# Patient Record
Sex: MALE | Race: WHITE | NOT HISPANIC OR LATINO | Employment: OTHER | URBAN - METROPOLITAN AREA
[De-identification: names, ages, dates, MRNs, and addresses within clinical notes are randomized per-mention and may not be internally consistent; named-entity substitution may affect disease eponyms.]

---

## 2022-04-10 ENCOUNTER — HISTORICAL (OUTPATIENT)
Dept: ADMINISTRATIVE | Facility: HOSPITAL | Age: 67
End: 2022-04-10

## 2022-04-30 VITALS
HEIGHT: 71 IN | BODY MASS INDEX: 31.79 KG/M2 | SYSTOLIC BLOOD PRESSURE: 142 MMHG | WEIGHT: 227.06 LBS | DIASTOLIC BLOOD PRESSURE: 81 MMHG | OXYGEN SATURATION: 97 %

## 2023-04-11 DIAGNOSIS — C61 MALIGNANT NEOPLASM OF PROSTATE: Primary | ICD-10-CM

## 2023-04-18 ENCOUNTER — HOSPITAL ENCOUNTER (OUTPATIENT)
Dept: RADIOLOGY | Facility: HOSPITAL | Age: 68
Discharge: HOME | End: 2023-04-18
Attending: NURSE PRACTITIONER
Payer: MEDICARE

## 2023-04-18 DIAGNOSIS — C61 MALIGNANT NEOPLASM OF PROSTATE: ICD-10-CM

## 2023-04-18 PROCEDURE — 78815 PET IMAGE W/CT SKULL-THIGH: CPT | Mod: TC

## 2023-05-18 ENCOUNTER — TELEPHONE (OUTPATIENT)
Dept: PRIMARY CARE CLINIC | Facility: CLINIC | Age: 68
End: 2023-05-18
Payer: MEDICARE

## 2023-05-18 NOTE — TELEPHONE ENCOUNTER
Pre Visit Call    Pt has visit on ....05/22  Did pt confirm appointment? no  Did pt answer or left vm on mobile number? Left vm   Wellness labs done?

## 2023-05-21 PROBLEM — N40.3 URINARY OBSTRUCTION DUE TO NODULAR PROSTATE: Status: ACTIVE | Noted: 2023-01-27

## 2023-05-21 PROBLEM — N13.8 URINARY OBSTRUCTION DUE TO NODULAR PROSTATE: Status: ACTIVE | Noted: 2023-01-27

## 2023-05-21 PROBLEM — R73.09 BLOOD GLUCOSE ABNORMAL: Status: ACTIVE | Noted: 2023-05-21

## 2023-05-21 PROBLEM — I10 HYPERTENSION: Status: ACTIVE | Noted: 2023-05-21

## 2023-05-21 PROBLEM — I65.21 STENOSIS OF RIGHT CAROTID ARTERY: Status: ACTIVE | Noted: 2023-05-21

## 2023-05-21 PROBLEM — E66.9 OBESITY: Status: ACTIVE | Noted: 2023-05-21

## 2023-05-21 PROBLEM — N39.41 URGE INCONTINENCE OF URINE: Status: ACTIVE | Noted: 2023-01-27

## 2023-05-21 PROBLEM — R97.20 RAISED PROSTATE SPECIFIC ANTIGEN: Status: ACTIVE | Noted: 2023-01-27

## 2023-05-21 PROBLEM — R68.82 REDUCED LIBIDO: Status: ACTIVE | Noted: 2023-05-21

## 2023-05-21 PROBLEM — E22.2 SYNDROME OF INAPPROPRIATE VASOPRESSIN SECRETION: Status: ACTIVE | Noted: 2023-05-21

## 2023-05-21 PROBLEM — C61 MALIGNANT TUMOR OF PROSTATE: Status: ACTIVE | Noted: 2023-02-24

## 2023-05-21 PROBLEM — Z95.1 HISTORY OF CORONARY ARTERY BYPASS GRAFT: Status: ACTIVE | Noted: 2023-05-21

## 2023-05-21 PROBLEM — K21.9 GASTROESOPHAGEAL REFLUX DISEASE: Status: ACTIVE | Noted: 2023-05-21

## 2023-05-21 PROBLEM — R33.9 INCOMPLETE EMPTYING OF BLADDER: Status: ACTIVE | Noted: 2023-01-27

## 2023-05-21 PROBLEM — I25.10 ARTERIOSCLEROSIS OF CORONARY ARTERY: Status: ACTIVE | Noted: 2023-05-21

## 2023-05-21 PROBLEM — I44.30 ATRIOVENTRICULAR BLOCK: Status: ACTIVE | Noted: 2023-05-21

## 2023-05-21 PROBLEM — N18.9 CHRONIC RENAL FAILURE: Status: ACTIVE | Noted: 2023-05-21

## 2023-05-21 PROBLEM — E78.2 MIXED HYPERLIPIDEMIA: Status: ACTIVE | Noted: 2023-05-21

## 2023-05-22 ENCOUNTER — OFFICE VISIT (OUTPATIENT)
Dept: PRIMARY CARE CLINIC | Facility: CLINIC | Age: 68
End: 2023-05-22
Payer: MEDICARE

## 2023-05-22 VITALS
OXYGEN SATURATION: 98 % | RESPIRATION RATE: 18 BRPM | HEART RATE: 49 BPM | SYSTOLIC BLOOD PRESSURE: 128 MMHG | HEIGHT: 71 IN | DIASTOLIC BLOOD PRESSURE: 60 MMHG | WEIGHT: 189 LBS | BODY MASS INDEX: 26.46 KG/M2

## 2023-05-22 DIAGNOSIS — C61 MALIGNANT TUMOR OF PROSTATE: Chronic | ICD-10-CM

## 2023-05-22 DIAGNOSIS — R73.09 BLOOD GLUCOSE ABNORMAL: ICD-10-CM

## 2023-05-22 DIAGNOSIS — E22.2 SYNDROME OF INAPPROPRIATE VASOPRESSIN SECRETION: ICD-10-CM

## 2023-05-22 DIAGNOSIS — N13.8 URINARY OBSTRUCTION DUE TO NODULAR PROSTATE: Chronic | ICD-10-CM

## 2023-05-22 DIAGNOSIS — N40.3 URINARY OBSTRUCTION DUE TO NODULAR PROSTATE: Chronic | ICD-10-CM

## 2023-05-22 DIAGNOSIS — E78.5 DYSLIPIDEMIA: Chronic | ICD-10-CM

## 2023-05-22 DIAGNOSIS — Z76.89 ESTABLISHING CARE WITH NEW DOCTOR, ENCOUNTER FOR: Primary | ICD-10-CM

## 2023-05-22 DIAGNOSIS — Z95.1 HISTORY OF CORONARY ARTERY BYPASS GRAFT: Chronic | ICD-10-CM

## 2023-05-22 DIAGNOSIS — I10 PRIMARY HYPERTENSION: Chronic | ICD-10-CM

## 2023-05-22 DIAGNOSIS — N18.9 CHRONIC RENAL FAILURE, UNSPECIFIED CKD STAGE: ICD-10-CM

## 2023-05-22 DIAGNOSIS — F41.8 ANXIETY WITH DEPRESSION: Chronic | ICD-10-CM

## 2023-05-22 DIAGNOSIS — N39.41 URGE INCONTINENCE OF URINE: ICD-10-CM

## 2023-05-22 DIAGNOSIS — Z00.00 WELLNESS EXAMINATION: ICD-10-CM

## 2023-05-22 PROBLEM — R33.9 INCOMPLETE EMPTYING OF BLADDER: Status: RESOLVED | Noted: 2023-01-27 | Resolved: 2023-05-22

## 2023-05-22 PROBLEM — E66.9 OBESITY: Status: RESOLVED | Noted: 2023-05-21 | Resolved: 2023-05-22

## 2023-05-22 PROBLEM — E78.2 MIXED HYPERLIPIDEMIA: Chronic | Status: ACTIVE | Noted: 2023-05-21

## 2023-05-22 PROBLEM — I65.21 STENOSIS OF RIGHT CAROTID ARTERY: Chronic | Status: ACTIVE | Noted: 2023-05-21

## 2023-05-22 PROBLEM — K21.9 GASTROESOPHAGEAL REFLUX DISEASE: Status: RESOLVED | Noted: 2023-05-21 | Resolved: 2023-05-22

## 2023-05-22 PROCEDURE — 99204 OFFICE O/P NEW MOD 45 MIN: CPT | Mod: ,,, | Performed by: GENERAL PRACTICE

## 2023-05-22 PROCEDURE — 99204 PR OFFICE/OUTPT VISIT, NEW, LEVL IV, 45-59 MIN: ICD-10-PCS | Mod: ,,, | Performed by: GENERAL PRACTICE

## 2023-05-22 RX ORDER — FUROSEMIDE 20 MG/1
20 TABLET ORAL
COMMUNITY
Start: 2023-03-13 | End: 2023-08-28 | Stop reason: SDUPTHER

## 2023-05-22 RX ORDER — FLUOXETINE HYDROCHLORIDE 20 MG/1
20 CAPSULE ORAL DAILY
Qty: 90 CAPSULE | Refills: 3 | Status: SHIPPED | OUTPATIENT
Start: 2023-05-22 | End: 2023-08-28

## 2023-05-22 RX ORDER — TAMSULOSIN HYDROCHLORIDE 0.4 MG/1
1 CAPSULE ORAL
COMMUNITY
Start: 2023-03-13

## 2023-05-22 RX ORDER — CARVEDILOL 25 MG/1
25 TABLET ORAL 2 TIMES DAILY
COMMUNITY
Start: 2023-03-24

## 2023-05-22 RX ORDER — CLOPIDOGREL BISULFATE 75 MG/1
75 TABLET ORAL
COMMUNITY
Start: 2023-04-27

## 2023-05-22 RX ORDER — ESCITALOPRAM OXALATE 20 MG/1
20 TABLET ORAL
COMMUNITY
Start: 2023-04-27 | End: 2023-05-22

## 2023-05-22 RX ORDER — AMLODIPINE AND BENAZEPRIL HYDROCHLORIDE 10; 40 MG/1; MG/1
1 CAPSULE ORAL
COMMUNITY
Start: 2023-03-13 | End: 2023-08-28 | Stop reason: SDUPTHER

## 2023-05-22 RX ORDER — FINASTERIDE 5 MG/1
5 TABLET, FILM COATED ORAL
COMMUNITY
Start: 2023-04-27

## 2023-05-22 RX ORDER — ATORVASTATIN CALCIUM 40 MG/1
40 TABLET, FILM COATED ORAL
COMMUNITY
Start: 2023-03-24

## 2023-05-22 RX ORDER — ASPIRIN 81 MG/1
TABLET ORAL
COMMUNITY

## 2023-05-22 NOTE — ASSESSMENT & PLAN NOTE
Continue cardiology follow-up, we will try to consolidate his blood work, lipid panel and CMP will be checked at his next wellness in six months.

## 2023-05-22 NOTE — LETTER
AUTHORIZATION FOR RELEASE OF   CONFIDENTIAL INFORMATION    Dear Dr Chakraborty,    We are seeing Curly Schwartz, date of birth 1955, in the clinic at [facility]. Jimbo Mora MD is the patient's PCP. Curly Schwartz has an outstanding lab/procedure at the time we reviewed his chart. In order to help keep his health information updated, he has authorized us to request the following medical record(s):        (  )  MAMMOGRAM                                      ( X )  COLONOSCOPY      (  )  PAP SMEAR                                          (  )  OUTSIDE LAB RESULTS     (  )  DEXA SCAN                                          (  )  EYE EXAM            (  )  FOOT EXAM                                          (  )  ENTIRE RECORD     (  )  OUTSIDE IMMUNIZATIONS                 (  )  _______________         Please fax records to [facility], Pernell J Simon, MD, [phone]     If you have any questions, please contact Priya Serrano LPN at [phone].           Patient Name: Curly Schwartz  : 1955  Patient Phone #: [phone]

## 2023-05-22 NOTE — PROGRESS NOTES
Subjective:       Patient ID: Curly Schwartz is a 67 y.o. male.    Chief Complaint: Establish Care    Patient presents to the clinic to establish primary care.  Past medical, family, and social history is reviewed, as well as all chronic conditions, and medications prescribed to treat those conditions.  He does have a history of coronary artery disease, four vessel bypass surgery in 2000, stable, does see a cardiologist, Dr. Lowery on a regular basis.  History of prostate cancer, diagnosed this past January, seeing Dr. Lockett, about to start treatment, TURP, and radiation with Dr. Stein.  History of anxiety with depression, previously on fluoxetine, discontinued by his previous PCP, always felt that the Prozac worked, was switched to Lexapro, not as effective, would like to switch back to Prozac if possible.  History of bladder obstruction, currently has an indwelling catheter, more than likely because of his prostate, diagnosed with chronic renal failure, probably secondary to this.  Have no previous lab results.    No complaints or problems today.      Review of Systems   Constitutional: Negative.  Negative for fatigue and fever.   HENT: Negative.  Negative for sore throat and trouble swallowing.    Eyes: Negative.  Negative for redness and visual disturbance.   Respiratory: Negative.  Negative for chest tightness and shortness of breath.    Cardiovascular: Negative.  Negative for chest pain.   Gastrointestinal: Negative.  Negative for abdominal pain, blood in stool and nausea.   Endocrine: Negative.  Negative for cold intolerance, heat intolerance and polyuria.   Genitourinary: Negative.    Musculoskeletal: Negative.  Negative for arthralgias, gait problem and joint swelling.   Integumentary:  Negative for rash. Negative.   Neurological: Negative.  Negative for dizziness, weakness and headaches.   Psychiatric/Behavioral: Negative.  Negative for agitation and dysphoric mood.        Objective:     Vitals:     "05/22/23 0956   BP: 128/60   Pulse: (!) 49   Resp: 18   SpO2: 98%   Weight: 85.7 kg (189 lb)   Height: 5' 11" (1.803 m)   Body mass index is 26.36 kg/m².     Physical Exam  Constitutional:       Appearance: Normal appearance.   HENT:      Head: Normocephalic.      Mouth/Throat:      Pharynx: Oropharynx is clear.   Eyes:      Conjunctiva/sclera: Conjunctivae normal.      Pupils: Pupils are equal, round, and reactive to light.   Cardiovascular:      Rate and Rhythm: Normal rate and regular rhythm.      Heart sounds: Normal heart sounds.   Pulmonary:      Effort: Pulmonary effort is normal.      Breath sounds: Normal breath sounds.   Abdominal:      General: Abdomen is flat.      Palpations: Abdomen is soft.   Musculoskeletal:         General: Normal range of motion.      Cervical back: Neck supple.   Skin:     General: Skin is warm and dry.   Neurological:      General: No focal deficit present.      Mental Status: He is oriented to person, place, and time.   Psychiatric:         Mood and Affect: Mood normal.         Behavior: Behavior normal.         Thought Content: Thought content normal.         Judgment: Judgment normal.         Assessment:     Problem List Items Addressed This Visit          Psychiatric    Anxiety with depression (Chronic)    Current Assessment & Plan     Discontinue Lexapro, start fluoxetine 20 mg daily, and recheck in six weeks.           Relevant Medications    FLUoxetine 20 MG capsule       Cardiac/Vascular    Primary hypertension (Chronic)    Relevant Orders    Hemoglobin A1C    TSH    Lipid Panel    Comprehensive Metabolic Panel    CBC Auto Differential    History of coronary artery bypass graft (Chronic)    Current Assessment & Plan     Continue cardiology follow-up, we will try to consolidate his blood work, lipid panel and CMP will be checked at his next wellness in six months.           Dyslipidemia (Chronic)    Current Assessment & Plan     Takes Lipitor 40 mg daily, lipid panel will " be checked in six months.              Renal/    Urinary obstruction due to nodular prostate (Chronic)    Current Assessment & Plan     Currently has an indwelling catheter, we will continue to see Dr. Lockett for this.           Urge incontinence of urine    Current Assessment & Plan     Unsure as to whether this is an active ongoing issue, but we will continue to monitor, more than likely this was because of his prostate issues.  He will continue to see Dr. Lockett.           Chronic renal failure    Current Assessment & Plan     Not sure if this is an active diagnosis, kidney functions will be checked at his next wellness in six months, more than likely this was secondary to his bladder obstruction.           Relevant Orders    Comprehensive Metabolic Panel       Oncology    Malignant tumor of prostate (Chronic)    Current Assessment & Plan     Diagnosed with prostate cancer this past January, will be beginning treatment soon with TURP and radiation.              Endocrine    Syndrome of inappropriate vasopressin secretion    Current Assessment & Plan     This does not seem to be an active ongoing diagnosis, we will resolve this as a chronic condition over time if it is not applicable to his current healthcare situation.           Blood glucose abnormal    Current Assessment & Plan     Not sure if this is a current condition, A1c and fasting glucose will be checked at his next wellness.           Relevant Orders    Hemoglobin A1C     Other Visit Diagnoses       Establishing care with new doctor, encounter for    -  Primary    Patient's medical care has been established in this clinic.  All issues addressed, all questions answered,  with appropriate scheduling of follow-up care.    Wellness examination        Relevant Orders    Hemoglobin A1C    TSH    Lipid Panel    Comprehensive Metabolic Panel    CBC Auto Differential    Hepatitis C Antibody                 Plan:             Follow up in about 6 months (around  11/30/2023) for Medicare Wellness.

## 2023-05-22 NOTE — ASSESSMENT & PLAN NOTE
Unsure as to whether this is an active ongoing issue, but we will continue to monitor, more than likely this was because of his prostate issues.  He will continue to see Dr. Lockett.

## 2023-05-22 NOTE — ASSESSMENT & PLAN NOTE
Not sure if this is a current condition, A1c and fasting glucose will be checked at his next wellness.

## 2023-05-22 NOTE — ASSESSMENT & PLAN NOTE
This does not seem to be an active ongoing diagnosis, we will resolve this as a chronic condition over time if it is not applicable to his current healthcare situation.

## 2023-05-22 NOTE — ASSESSMENT & PLAN NOTE
Not sure if this is an active diagnosis, kidney functions will be checked at his next wellness in six months, more than likely this was secondary to his bladder obstruction.

## 2023-05-22 NOTE — ASSESSMENT & PLAN NOTE
Diagnosed with prostate cancer this past January, will be beginning treatment soon with TURP and radiation.

## 2023-06-26 PROCEDURE — 87088 URINE BACTERIA CULTURE: CPT | Performed by: NURSE PRACTITIONER

## 2023-06-26 PROCEDURE — 87077 CULTURE AEROBIC IDENTIFY: CPT | Performed by: NURSE PRACTITIONER

## 2023-06-28 ENCOUNTER — LAB REQUISITION (OUTPATIENT)
Dept: LAB | Facility: HOSPITAL | Age: 68
End: 2023-06-28
Payer: MEDICARE

## 2023-06-28 DIAGNOSIS — N39.0 URINARY TRACT INFECTION, SITE NOT SPECIFIED: ICD-10-CM

## 2023-06-30 LAB — BACTERIA UR CULT: ABNORMAL

## 2023-08-27 NOTE — PROGRESS NOTES
"To Subjective:       Patient ID: Curly Schwartz is a 67 y.o. male.    Chief Complaint: No chief complaint on file.    Established patient, returns for recheck after starting fluoxetine 20 mg daily, and discontinuing Lexapro at his initial visit on 05/22/2023, history of anxiety with depression.  The fluoxetine seems to be working well, feels like he could be on a slightly higher dose.  I did review his chart once again, found that he did have some low potassium levels about six weeks ago, not on potassium supplementation.  Currently undergoing prostate cancer treatment, thinks they might be checking his electrolytes, not completely sure.      Review of Systems   Constitutional:  Negative for activity change, appetite change and fatigue.   Respiratory:  Negative for shortness of breath.    Cardiovascular:  Negative for chest pain.   Neurological:  Negative for memory loss.   Psychiatric/Behavioral:  Negative for agitation, confusion, decreased concentration, dysphoric mood and sleep disturbance.          Objective:     Vitals:    08/28/23 1431   BP: 132/68   Pulse: 72   Resp: 18   SpO2: 98%   Weight: 81.6 kg (180 lb)   Height: 5' 11" (1.803 m)   Body mass index is 25.1 kg/m².     Physical Exam  Constitutional:       Appearance: Normal appearance.   HENT:      Head: Normocephalic.   Eyes:      Pupils: Pupils are equal, round, and reactive to light.   Cardiovascular:      Rate and Rhythm: Normal rate and regular rhythm.   Pulmonary:      Effort: Pulmonary effort is normal.      Breath sounds: Normal breath sounds.   Musculoskeletal:      Left lower leg: Edema present.   Psychiatric:         Mood and Affect: Mood normal.         Behavior: Behavior normal.         Thought Content: Thought content normal.         Judgment: Judgment normal.           Assessment:     Problem List Items Addressed This Visit          Psychiatric    Anxiety with depression - Primary (Chronic)    Overview     Prescribed fluoxetine 20 mg " daily.         Current Assessment & Plan     Patient reports stability of moods, and effectiveness of medications, including no agitation, significant somnolence, or significant bouts of anxiety or depression.  Patient is not having any sleep disturbance.  Current treatment plan will continue, with plans to discuss any significant changes in patient's status if necessary if anything changes in the future.         Relevant Medications    FLUoxetine 40 MG capsule       Cardiac/Vascular    Primary hypertension (Chronic)    Overview     Prescribed amlodipine benazepril 10-40 mg daily, Coreg 25 mg b.i.d..         Current Assessment & Plan     Blood pressures appear to be adequately controlled with current treatment plan, including prescription blood pressure medication. Continue medical management and continue home blood pressure checks.  Blood pressure should be checked as discussed during medical visits, and average blood pressure should be no greater than 130/80.         Relevant Medications    amLODIPine-benazepriL (LOTREL) 10-40 mg per capsule    History of coronary artery bypass graft (Chronic)    Overview     Sees Dr. Lowery.  History of CABG several years ago.         Current Assessment & Plan     Medical condition is currently stable, continue current treatment plan.         Dyslipidemia (Chronic)    Overview     Prescribed Lipitor 40 mg daily.         Current Assessment & Plan     Lipid panel will be checked at next wellness exam            Renal/    Hypokalemia (Chronic)    Current Assessment & Plan     There have been several low potassium levels in the past six weeks, will recheck a BMP today, and consider potassium supplementation if his levels continue to be low.         Relevant Orders    Basic Metabolic Panel       Oncology    Malignant tumor of prostate (Chronic)    Current Assessment & Plan     Currently being treated for prostate cancer by his urologist, Dr. Lockett.            Other    Lower  extremity edema (Chronic)    Current Assessment & Plan     Chronic lower extremity edema, worse on the left side, the vein was removed for his CABG.  Takes furosemide 20 mg daily, continue current treatment plan.         Relevant Medications    furosemide (LASIX) 20 MG tablet          Current Outpatient Medications   Medication Instructions    abiraterone (ZYTIGA) 1,000 mg, Oral    amLODIPine-benazepriL (LOTREL) 10-40 mg per capsule 1 capsule, Oral, Daily    aspirin (ECOTRIN) 81 MG EC tablet Aspir-Low 81 mg tablet,delayed release   Take 1 tablet every day by oral route.    atorvastatin (LIPITOR) 40 mg, Oral    carvediloL (COREG) 25 mg, Oral, 2 times daily    clopidogreL (PLAVIX) 75 mg, Oral    finasteride (PROSCAR) 5 mg, Oral    FLUoxetine 40 mg, Oral, Daily    furosemide (LASIX) 20 mg, Oral, Daily    predniSONE (DELTASONE) 5 mg, Oral    tamsulosin (FLOMAX) 0.4 mg Cap 1 capsule, Oral     Plan:             No follow-ups on file.

## 2023-08-28 ENCOUNTER — OFFICE VISIT (OUTPATIENT)
Dept: PRIMARY CARE CLINIC | Facility: CLINIC | Age: 68
End: 2023-08-28
Payer: MEDICARE

## 2023-08-28 VITALS
SYSTOLIC BLOOD PRESSURE: 132 MMHG | BODY MASS INDEX: 25.2 KG/M2 | OXYGEN SATURATION: 98 % | RESPIRATION RATE: 18 BRPM | WEIGHT: 180 LBS | DIASTOLIC BLOOD PRESSURE: 68 MMHG | HEIGHT: 71 IN | HEART RATE: 72 BPM

## 2023-08-28 DIAGNOSIS — E87.6 HYPOKALEMIA: Chronic | ICD-10-CM

## 2023-08-28 DIAGNOSIS — Z95.1 HISTORY OF CORONARY ARTERY BYPASS GRAFT: Chronic | ICD-10-CM

## 2023-08-28 DIAGNOSIS — I10 PRIMARY HYPERTENSION: Chronic | ICD-10-CM

## 2023-08-28 DIAGNOSIS — E78.5 DYSLIPIDEMIA: Chronic | ICD-10-CM

## 2023-08-28 DIAGNOSIS — R60.0 LOWER EXTREMITY EDEMA: Chronic | ICD-10-CM

## 2023-08-28 DIAGNOSIS — F41.8 ANXIETY WITH DEPRESSION: Primary | Chronic | ICD-10-CM

## 2023-08-28 DIAGNOSIS — C61 MALIGNANT TUMOR OF PROSTATE: Chronic | ICD-10-CM

## 2023-08-28 PROBLEM — N40.3 URINARY OBSTRUCTION DUE TO NODULAR PROSTATE: Chronic | Status: RESOLVED | Noted: 2023-01-27 | Resolved: 2023-08-28

## 2023-08-28 PROBLEM — I25.10 ARTERIOSCLEROSIS OF CORONARY ARTERY: Status: RESOLVED | Noted: 2023-05-21 | Resolved: 2023-08-28

## 2023-08-28 PROBLEM — N39.41 URGE INCONTINENCE OF URINE: Chronic | Status: ACTIVE | Noted: 2023-01-27

## 2023-08-28 PROBLEM — N13.8 URINARY OBSTRUCTION DUE TO NODULAR PROSTATE: Chronic | Status: RESOLVED | Noted: 2023-01-27 | Resolved: 2023-08-28

## 2023-08-28 LAB
ANION GAP SERPL CALC-SCNC: 10 MEQ/L
BUN SERPL-MCNC: 12.6 MG/DL (ref 8.4–25.7)
CALCIUM SERPL-MCNC: 8.9 MG/DL (ref 8.8–10)
CHLORIDE SERPL-SCNC: 97 MMOL/L (ref 98–107)
CO2 SERPL-SCNC: 31 MMOL/L (ref 23–31)
CREAT SERPL-MCNC: 0.96 MG/DL (ref 0.73–1.18)
CREAT/UREA NIT SERPL: 13
GFR SERPLBLD CREATININE-BSD FMLA CKD-EPI: >60 MLS/MIN/1.73/M2
GLUCOSE SERPL-MCNC: 130 MG/DL (ref 82–115)
POTASSIUM SERPL-SCNC: 3 MMOL/L (ref 3.5–5.1)
SODIUM SERPL-SCNC: 138 MMOL/L (ref 136–145)

## 2023-08-28 PROCEDURE — 36415 COLL VENOUS BLD VENIPUNCTURE: CPT | Mod: ,,, | Performed by: GENERAL PRACTICE

## 2023-08-28 PROCEDURE — 99214 PR OFFICE/OUTPT VISIT, EST, LEVL IV, 30-39 MIN: ICD-10-PCS | Mod: ,,, | Performed by: GENERAL PRACTICE

## 2023-08-28 PROCEDURE — 80048 BASIC METABOLIC PNL TOTAL CA: CPT | Performed by: GENERAL PRACTICE

## 2023-08-28 PROCEDURE — 99214 OFFICE O/P EST MOD 30 MIN: CPT | Mod: ,,, | Performed by: GENERAL PRACTICE

## 2023-08-28 PROCEDURE — 36415 COLL VENOUS BLD VENIPUNCTURE: CPT | Performed by: GENERAL PRACTICE

## 2023-08-28 PROCEDURE — 36415 PR COLLECTION VENOUS BLOOD,VENIPUNCTURE: ICD-10-PCS | Mod: ,,, | Performed by: GENERAL PRACTICE

## 2023-08-28 RX ORDER — ABIRATERONE ACETATE 250 MG/1
1000 TABLET ORAL
COMMUNITY
Start: 2023-08-13

## 2023-08-28 RX ORDER — AMLODIPINE AND BENAZEPRIL HYDROCHLORIDE 10; 40 MG/1; MG/1
1 CAPSULE ORAL DAILY
Qty: 90 CAPSULE | Refills: 3 | Status: SHIPPED | OUTPATIENT
Start: 2023-08-28

## 2023-08-28 RX ORDER — FUROSEMIDE 20 MG/1
20 TABLET ORAL DAILY
Qty: 90 TABLET | Refills: 3 | Status: SHIPPED | OUTPATIENT
Start: 2023-08-28

## 2023-08-28 RX ORDER — FLUOXETINE HYDROCHLORIDE 40 MG/1
40 CAPSULE ORAL DAILY
Qty: 90 CAPSULE | Refills: 3 | Status: SHIPPED | OUTPATIENT
Start: 2023-08-28 | End: 2024-08-27

## 2023-08-28 RX ORDER — PREDNISONE 5 MG/1
5 TABLET ORAL
COMMUNITY
Start: 2023-08-13

## 2023-08-28 NOTE — ASSESSMENT & PLAN NOTE
There have been several low potassium levels in the past six weeks, will recheck a BMP today, and consider potassium supplementation if his levels continue to be low.

## 2023-08-28 NOTE — ASSESSMENT & PLAN NOTE
Chronic lower extremity edema, worse on the left side, the vein was removed for his CABG.  Takes furosemide 20 mg daily, continue current treatment plan.

## 2023-09-06 ENCOUNTER — TELEPHONE (OUTPATIENT)
Dept: PRIMARY CARE CLINIC | Facility: CLINIC | Age: 68
End: 2023-09-06
Payer: MEDICARE

## 2023-09-06 DIAGNOSIS — E87.6 HYPOKALEMIA: Primary | Chronic | ICD-10-CM

## 2023-09-06 RX ORDER — POTASSIUM CHLORIDE 20 MEQ/1
20 TABLET, EXTENDED RELEASE ORAL DAILY
Qty: 90 TABLET | Refills: 3 | Status: SHIPPED | OUTPATIENT
Start: 2023-09-06 | End: 2024-09-05

## 2023-09-06 NOTE — TELEPHONE ENCOUNTER
Phone call, potassium level low at 3.0, takes furosemide daily for edema.  Prescribed potassium supplementation 20 mEq daily, recheck at his next wellness in November.

## 2023-11-20 ENCOUNTER — CLINICAL SUPPORT (OUTPATIENT)
Dept: PRIMARY CARE CLINIC | Facility: CLINIC | Age: 68
End: 2023-11-20
Payer: MEDICARE

## 2023-11-20 DIAGNOSIS — Z00.00 WELLNESS EXAMINATION: ICD-10-CM

## 2023-11-20 DIAGNOSIS — Z00.00 ENCOUNTER FOR WELLNESS EXAMINATION IN ADULT: Primary | ICD-10-CM

## 2023-11-20 DIAGNOSIS — I10 PRIMARY HYPERTENSION: ICD-10-CM

## 2023-11-20 DIAGNOSIS — N18.9 CHRONIC RENAL FAILURE, UNSPECIFIED CKD STAGE: ICD-10-CM

## 2023-11-20 DIAGNOSIS — R73.09 BLOOD GLUCOSE ABNORMAL: ICD-10-CM

## 2023-11-20 LAB
ALBUMIN SERPL-MCNC: 3.9 G/DL (ref 3.4–4.8)
ALBUMIN/GLOB SERPL: 1.3 RATIO (ref 1.1–2)
ALP SERPL-CCNC: 64 UNIT/L (ref 40–150)
ALT SERPL-CCNC: 12 UNIT/L (ref 0–55)
AST SERPL-CCNC: 17 UNIT/L (ref 5–34)
BASOPHILS # BLD AUTO: 0.06 X10(3)/MCL
BASOPHILS NFR BLD AUTO: 1 %
BILIRUB SERPL-MCNC: 1 MG/DL
BUN SERPL-MCNC: 14.8 MG/DL (ref 8.4–25.7)
CALCIUM SERPL-MCNC: 9.5 MG/DL (ref 8.8–10)
CHLORIDE SERPL-SCNC: 100 MMOL/L (ref 98–107)
CHOLEST SERPL-MCNC: 141 MG/DL
CHOLEST/HDLC SERPL: 2 {RATIO} (ref 0–5)
CO2 SERPL-SCNC: 33 MMOL/L (ref 23–31)
CREAT SERPL-MCNC: 0.92 MG/DL (ref 0.73–1.18)
EOSINOPHIL # BLD AUTO: 0.08 X10(3)/MCL (ref 0–0.9)
EOSINOPHIL NFR BLD AUTO: 1.3 %
ERYTHROCYTE [DISTWIDTH] IN BLOOD BY AUTOMATED COUNT: 13.8 % (ref 11.5–17)
EST. AVERAGE GLUCOSE BLD GHB EST-MCNC: 119.8 MG/DL
GFR SERPLBLD CREATININE-BSD FMLA CKD-EPI: >60 MLS/MIN/1.73/M2
GLOBULIN SER-MCNC: 3 GM/DL (ref 2.4–3.5)
GLUCOSE SERPL-MCNC: 115 MG/DL (ref 82–115)
HBA1C MFR BLD: 5.8 %
HCT VFR BLD AUTO: 39.4 % (ref 42–52)
HDLC SERPL-MCNC: 60 MG/DL (ref 35–60)
HGB BLD-MCNC: 13.7 G/DL (ref 14–18)
IMM GRANULOCYTES # BLD AUTO: 0.03 X10(3)/MCL (ref 0–0.04)
IMM GRANULOCYTES NFR BLD AUTO: 0.5 %
LDLC SERPL CALC-MCNC: 63 MG/DL (ref 50–140)
LYMPHOCYTES # BLD AUTO: 0.41 X10(3)/MCL (ref 0.6–4.6)
LYMPHOCYTES NFR BLD AUTO: 6.8 %
MCH RBC QN AUTO: 31.6 PG (ref 27–31)
MCHC RBC AUTO-ENTMCNC: 34.8 G/DL (ref 33–36)
MCV RBC AUTO: 91 FL (ref 80–94)
MONOCYTES # BLD AUTO: 0.96 X10(3)/MCL (ref 0.1–1.3)
MONOCYTES NFR BLD AUTO: 15.9 %
NEUTROPHILS # BLD AUTO: 4.49 X10(3)/MCL (ref 2.1–9.2)
NEUTROPHILS NFR BLD AUTO: 74.5 %
NRBC BLD AUTO-RTO: 0 %
PLATELET # BLD AUTO: 217 X10(3)/MCL (ref 130–400)
PMV BLD AUTO: 8.1 FL (ref 7.4–10.4)
POTASSIUM SERPL-SCNC: 4 MMOL/L (ref 3.5–5.1)
PROT SERPL-MCNC: 6.9 GM/DL (ref 5.8–7.6)
RBC # BLD AUTO: 4.33 X10(6)/MCL (ref 4.7–6.1)
SODIUM SERPL-SCNC: 140 MMOL/L (ref 136–145)
TRIGL SERPL-MCNC: 89 MG/DL (ref 34–140)
TSH SERPL-ACNC: 1.3 UIU/ML (ref 0.35–4.94)
VLDLC SERPL CALC-MCNC: 18 MG/DL
WBC # SPEC AUTO: 6.03 X10(3)/MCL (ref 4.5–11.5)

## 2023-11-20 PROCEDURE — 36415 COLL VENOUS BLD VENIPUNCTURE: CPT | Mod: ,,, | Performed by: GENERAL PRACTICE

## 2023-11-20 PROCEDURE — 36415 COLL VENOUS BLD VENIPUNCTURE: CPT

## 2023-11-20 PROCEDURE — 36415 PR COLLECTION VENOUS BLOOD,VENIPUNCTURE: ICD-10-PCS | Mod: ,,, | Performed by: GENERAL PRACTICE

## 2023-11-21 LAB — HCV AB SERPL QL IA: NONREACTIVE

## 2023-11-29 PROBLEM — R73.03 PREDIABETES: Chronic | Status: ACTIVE | Noted: 2023-11-29

## 2023-11-29 NOTE — ASSESSMENT & PLAN NOTE
Component Ref Range & Units 9 d ago   Cholesterol Total <=200 mg/dL 141   HDL Cholesterol 35 - 60 mg/dL 60   Triglyceride 34 - 140 mg/dL 89   Cholesterol/HDL Ratio 0 - 5 2   Very Low Density Lipoprotein  18   LDL Cholesterol 50.00 - 140.00 mg/dL 63.00        Most recent cholesterol/lipid blood testing shows adequate control, continue current treatment plan, including prescription medications if prescribed, and/or diet high in fiber, low in saturated fats as discussed during clinic visit.

## 2023-11-29 NOTE — PROGRESS NOTES
Patient ID: [id number]     Chief Complaint: Annual Exam      HPI:     Curly Schwartz is a 68 y.o. male here today for a Medicare Wellness. No other complaints today.       ----------------------------  Anxiety  Arteriosclerosis of coronary artery  Depression  Hyperlipidemia  Hypertension  Incomplete emptying of bladder  Urinary obstruction due to nodular prostate     Past Surgical History:   Procedure Laterality Date    CORONARY ARTERY BYPASS GRAFT  2000    CORONARY STENT PLACEMENT      TRANSURETHRAL RESECTION OF PROSTATE  07/06/2023    [facility]       Review of patient's allergies indicates:   Allergen Reactions    Sulfa (sulfonamide antibiotics) Rash       Outpatient Medications Marked as Taking for the 11/30/23 encounter (Office Visit) with Jimbo Mora MD   Medication Sig Dispense Refill    abiraterone (ZYTIGA) 250 mg Tab Take 1,000 mg by mouth.      amLODIPine-benazepriL (LOTREL) 10-40 mg per capsule Take 1 capsule by mouth once daily. 90 capsule 3    aspirin (ECOTRIN) 81 MG EC tablet Aspir-Low 81 mg tablet,delayed release   Take 1 tablet every day by oral route.      atorvastatin (LIPITOR) 40 MG tablet Take 40 mg by mouth.      calcium phosphate trib/vit D3 (CALCIUM PHOSPHATE-VITAMIN D3 ORAL) Take 1 capsule by mouth 2 (two) times a day.      carvediloL (COREG) 25 MG tablet Take 25 mg by mouth 2 (two) times daily.      clopidogreL (PLAVIX) 75 mg tablet Take 75 mg by mouth.      finasteride (PROSCAR) 5 mg tablet Take 5 mg by mouth.      FLUoxetine 40 MG capsule Take 1 capsule (40 mg total) by mouth once daily. 90 capsule 3    furosemide (LASIX) 20 MG tablet Take 1 tablet (20 mg total) by mouth once daily. 90 tablet 3    potassium chloride SA (K-DUR,KLOR-CON) 20 MEQ tablet Take 1 tablet (20 mEq total) by mouth once daily. 90 tablet 3    predniSONE (DELTASONE) 5 MG tablet Take 5 mg by mouth.      tamsulosin (FLOMAX) 0.4 mg Cap Take 1 capsule by mouth.         Social History     Socioeconomic History    Marital  status:    Tobacco Use    Smoking status: Never    Smokeless tobacco: Never   Substance and Sexual Activity    Alcohol use: Not Currently     Alcohol/week: 2.0 standard drinks of alcohol     Types: 2 Cans of beer per week    Drug use: Never    Sexual activity: Not Currently     Partners: Female     Birth control/protection: None        Family History   Problem Relation Age of Onset    Cancer Mother         Kidney    Depression Mother     Hyperlipidemia Mother     Heart disease Father     Hyperlipidemia Father         Patient Care Team:  Jimbo Mora MD as PCP - General (Family Medicine)  MATTHIAS Chakraborty MD (Gastroenterology)  Lawson Lowery II, MD as Consulting Physician (Cardiology)  Drew Lockett MD as Consulting Physician (Urology)  Lewis Parmar MD as Consulting Physician (Urology)     Opioid Screening: Patient medication list reviewed, patient is not taking prescription opioids. Patient is not using additional opioids than prescribed. Patient is at low risk of substance abuse based on this opioid use history.       Subjective:     Review of Systems   Constitutional: Negative.  Negative for malaise/fatigue and weight loss.   HENT: Negative.     Eyes: Negative.    Respiratory: Negative.  Negative for shortness of breath.    Cardiovascular: Negative.  Negative for chest pain.   Gastrointestinal: Negative.    Genitourinary: Negative.    Musculoskeletal: Negative.    Skin: Negative.    Neurological: Negative.  Negative for focal weakness, weakness and headaches.   Endo/Heme/Allergies: Negative.    Psychiatric/Behavioral: Negative.  Negative for depression and memory loss. The patient is not nervous/anxious.          Patient Reported Health Risk Assessment  [age]  Are you male or female?: Male  During the past four weeks, how much have you been bothered by emotional problems such as feeling anxious, depressed, irritable, sad, or downhearted and blue?: Not at all  During the  past five weeks, has your physical and/or emotional health limited your social activities with family, friends, neighbors, or groups?: Not at all  During the past four weeks, how much bodily pain have you generally had?: No pain  During the past four weeks, was someone available to help if you needed and wanted help?: Yes, as much as I wanted  During the past four weeks, what was the hardest physical activity you could do for at least two minutes?: Light  Can you get to places out of walking distance without help?  (For example, can you travel alone on buses or taxis, or drive your own car?): Yes  Can you go shopping for groceries or clothes without someone's help?: Yes  Can you prepare your own meals?: Yes  Can you do your own housework without help?: Yes  Because of any health problems, do you need the help of another person with your personal care needs such as eating, bathing, dressing, or getting around the house?: No  Can you handle your own money without help?: Yes  During the past four weeks, how would you rate your health in general?: Good  How have things been going for you during the past four weeks?: Pretty well  Are you having difficulties driving your car?: No  Do you always fasten your seat belt when you are in a car?: Yes, usually  How often in the past four weeks have you been bothered by falling or dizzy when standing up?: Never  How often in the past four weeks have you been bothered by sexual problems?: Never  How often in the past four weeks have you been bothered by trouble eating well?: Never  How often in the past four weeks have you been bothered by teeth or denture problems?: Never  How often in the past four weeks have you been bothered with problems using the telephone?: Never  How often in the past four weeks have you been bothered by tiredness or fatigue?: Never  Have you fallen two or more times in the past year?: No  Are you afraid of falling?: No  Are you a smoker?: No  During the  "past four weeks, how many drinks of wine, beer, or other alcoholic beverages did you have?: One drink or less per week  Do you exercise for about 20 minutes three or more days a week?: No, I usually do not exercise this much  Have you been given any information to help you with hazards in your house that might hurt you?: No  Have you been given any information to help you with keeping track of your medications?: No  How often do you have trouble taking medicines the way you've been told to take them?: I always take them as prescribed  How confident are you that you can control and manage most of your health problems?: Very confident    Objective:     /60   Pulse 68   Resp 18   Ht 5' 11" (1.803 m)   Wt 82.6 kg (182 lb)   SpO2 99%   BMI 25.38 kg/m²     Physical Exam  Constitutional:       Appearance: Normal appearance.   HENT:      Head: Normocephalic.      Mouth/Throat:      Mouth: Mucous membranes are moist.      Pharynx: Oropharynx is clear.   Eyes:      Conjunctiva/sclera: Conjunctivae normal.      Pupils: Pupils are equal, round, and reactive to light.   Cardiovascular:      Rate and Rhythm: Normal rate and regular rhythm.      Heart sounds: Normal heart sounds.   Pulmonary:      Effort: Pulmonary effort is normal.      Breath sounds: Normal breath sounds.   Abdominal:      General: Abdomen is flat.      Palpations: Abdomen is soft.   Musculoskeletal:         General: Normal range of motion.      Cervical back: Neck supple.   Skin:     General: Skin is warm and dry.   Neurological:      General: No focal deficit present.      Mental Status: He is alert and oriented to person, place, and time.   Psychiatric:         Mood and Affect: Mood normal.         Behavior: Behavior normal.         Thought Content: Thought content normal.         Judgment: Judgment normal.         Lab Results   Component Value Date     11/20/2023    K 4.0 11/20/2023     07/07/2023    CO2 33 (H) 11/20/2023    BUN 14.8 " 11/20/2023    CREATININE 0.92 11/20/2023    CALCIUM 9.5 11/20/2023    ALBUMIN 3.9 11/20/2023    BILITOT 1.0 11/20/2023    ALKPHOS 64 11/20/2023    AST 17 11/20/2023    ALT 12 11/20/2023    ANIONGAP 8 07/07/2023    ESTGFRAFRICA 90 07/07/2023    EGFRNORACEVR >60 11/20/2023     Lab Results   Component Value Date    WBC 6.03 11/20/2023    RBC 4.33 (L) 11/20/2023    HGB 13.7 (L) 11/20/2023    HCT 39.4 (L) 11/20/2023    MCV 91.0 11/20/2023    RDW 13.8 11/20/2023     11/20/2023      Lab Results   Component Value Date    CHOL 141 11/20/2023    TRIG 89 11/20/2023    HDL 60 11/20/2023    LDL 63.00 11/20/2023    TOTALCHOLEST 2 11/20/2023     Lab Results   Component Value Date    TSH 1.303 11/20/2023     Lab Results   Component Value Date    HGBA1C 5.8 11/20/2023             No data to display                   No data to display                    Depression Screening  Over the past two weeks, has the patient felt down, depressed, or hopeless?: No  Over the past two weeks, has the patient felt little interest or pleasure in doing things?: No  Functional Ability/Safety Screening  Was the patient's timed Up & Go test unsteady or longer than 30 seconds?: No  Does the patient need help with phone, transportation, shopping, preparing meals, housework, laundry, meds, or managing money?: No  Does the patient's home have rugs in the hallway, lack grab bars in the bathroom, lack handrails on the stairs or have poor lighting?: No  Have you noticed any hearing difficulties?: No  Cognitive Function (Assessed through direct observation with due consideration of information obtained by way of patient reports and/or concerns raised by family, friends, caretakers, or others)    Does the patient repeat questions/statements in the same day?: No  Does the patient have trouble remembering the date, year, and time?: No  Does the patient have difficulty managing finances?: No  Does the patient have a decreased sense of direction?:  No  Assessment:       ICD-10-CM ICD-9-CM   1. Medicare annual wellness visit, subsequent  Z00.00 V70.0   2. Screening for prostate cancer  Z12.5 V76.44   3. Primary hypertension  I10 401.9   4. Dyslipidemia  E78.5 272.4   5. Stenosis of right carotid artery  I65.21 433.10   6. History of coronary artery bypass graft  Z95.1 V45.81   7. Anxiety with depression  F41.8 300.4   8. Hypokalemia  E87.6 276.8   9. Prediabetes  R73.03 790.29   10. Normocytic anemia  D64.9 285.9        Plan:     Medicare Annual Wellness and Personalized Prevention Plan:   Fall Risk + Home Safety + Hearing Impairment + Depression Screen + Cognitive Impairment Screen + Health Risk Assessment all reviewed.       Health Maintenance Topics with due status: Not Due       Topic Last Completion Date    TETANUS VACCINE 05/28/2014    Hemoglobin A1c (Prediabetes) 11/20/2023    Lipid Panel 11/20/2023      The patient's Health Maintenance was reviewed and the following appears to be due at this time:   Health Maintenance Due   Topic Date Due    RSV Vaccine (Age 60+ and Pregnant patients) (1 - 1-dose 60+ series) Never done    Influenza Vaccine (1) 09/01/2023     Health risk assessment:  After review of the patient's medical record as it relates to health risk assessment over the next 5-10 years per recommendations of the [place]PSTF, it was determined that all pertinent recommendations have been appropriately addressed. The patient does not desire or need any further preventative care measures or screening tests at this time.  We will continue to reassess at each annual visit.    1. Medicare annual wellness visit, subsequent  Comments:  Overall health status was reviewed.  Good health habits reinforced.  Annual wellness exams recommended.    2. Screening for prostate cancer  Comments:  Patient does see Urology.    3. Primary hypertension  Overview:  Prescribed amlodipine benazepril 10-40 mg daily, Coreg 25 mg b.i.d..    Assessment & Plan:  Blood pressures appear  to be adequately controlled with current treatment plan, including prescription blood pressure medication. Continue medical management and continue home blood pressure checks.  Blood pressure should be checked as discussed during medical visits, and average blood pressure should be no greater than 130/80.      4. Dyslipidemia  Overview:  Prescribed Lipitor 40 mg daily.    Assessment & Plan:       Component Ref Range & Units 9 d ago   Cholesterol Total <=200 mg/dL 141   HDL Cholesterol 35 - 60 mg/dL 60   Triglyceride 34 - 140 mg/dL 89   Cholesterol/HDL Ratio 0 - 5 2   Very Low Density Lipoprotein  18   LDL Cholesterol 50.00 - 140.00 mg/dL 63.00        Most recent cholesterol/lipid blood testing shows adequate control, continue current treatment plan, including prescription medications if prescribed, and/or diet high in fiber, low in saturated fats as discussed during clinic visit.      5. Stenosis of right carotid artery  Overview:  He does have a cardiologist, Dr. Lowery, whom he sees regularly.    Assessment & Plan:  Medical condition is currently stable, continue current treatment plan.      6. History of coronary artery bypass graft  Overview:  Sees Dr. Lowery.  History of CABG several years ago.    Assessment & Plan:  Medical condition is currently stable, continue current treatment plan.      7. Anxiety with depression  Overview:  Prescribed fluoxetine 20 mg daily.    Assessment & Plan:  Patient reports stability of moods, and effectiveness of medications, including no agitation, significant somnolence, or significant bouts of anxiety or depression.  Patient is not having any sleep disturbance.  Current treatment plan will continue, with plans to discuss any significant changes in patient's status if necessary if anything changes in the future.      8. Hypokalemia  Overview:  Recurrent hypokalemia, on Lasix, prescribed potassium chloride 20 mEq daily.    Assessment & Plan:            Component Ref Range & Units  9 d ago  (11/20/23) 3 mo ago  (8/28/23) 4 mo ago  (7/7/23) 4 mo ago  (7/7/23) 4 mo ago  (7/6/23) 4 mo ago  (7/6/23)   Sodium Level 136 - 145 mmol/L 140 138 137 137 139 136   Potassium Level 3.5 - 5.1 mmol/L 4.0 3.0 Low        Chloride 98 - 107 mmol/L 100 97 Low  100 R 101 R 104 R 104 R   Carbon Dioxide 23 - 31 mmol/L 33 High  31 29 R 25 R 27 R 23 R   Glucose Level 82 - 115 mg/dL 115 130 High        Blood Urea Nitrogen 8.4 - 25.7 mg/dL 14.8 12.6 16 R 13 R 13 R 13 R   Creatinine 0.73 - 1.18 mg/dL 0.92 0.96 0.92 R 0.90 R 0.90 R 0.81 R        Most recent potassium level normal, continue level of potassium supplementation.      9. Prediabetes  Overview:  Not prescribed any medication for prediabetes.    Assessment & Plan:       Component Ref Range & Units 9 d ago   Hemoglobin A1c <=7.0 % 5.8   Estimated Average Glucose mg/dL 119.8        Current average blood sugar falls into a range that is determined to be prediabetes.  In other words, if your blood sugar goes any higher, you could become diabetic.  Therefore, it is essential that we start a treatment plan that decreases your average blood sugar.  Follow a healthy meal plan.  This includes eating lean proteins, complex carbohydrates in moderation (rice, bread, pasta, potatoes), fresh fruits and vegetables, low-fat dairy products and healthy fats such as avocado, olive, canola or vegetable oil.  Simple carbohydrates such as sweets, containing sugar should be avoided or consumed in controlled amounts.    Physical activity as recommended, 30 more minutes up to 5 days a week.  This could be something as simple as brisk walking or biking.    Weight loss is also beneficial and may reverse diabetes or prediabetes.      10. Normocytic anemia  Overview:  Probably anemia of chronic disease, appears mild, we will continue to monitor.    Assessment & Plan:  Component Ref Range & Units 10 d ago 4 mo ago   WBC 4.50 - 11.50 x10(3)/mcL 6.03    RBC 4.70 - 6.10 x10(6)/mcL 4.33 Low      Hgb 14.0 - 18.0 g/dL 13.7 Low  11.5 Low    Hct 42.0 - 52.0 % 39.4 Low     MCV 80.0 - 94.0 fL 91.0    MCH 27.0 - 31.0 pg 31.6 High     MCHC 33.0 - 36.0 g/dL 34.8    RDW 11.5 - 17.0 % 13.8    Platelet 130 - 400 x10(3)/mcL 217      Mild anemia, we will monitor annually.              Advance Care Planning     Date: 11/29/2023    Living Will  During this visit, I engaged the patient  in the voluntary advance care planning process.  The patient and I reviewed the role for advance directives and their purpose in directing future healthcare if the patient's unable to speak for him/herself.  At this point in time, the patient does have full decision-making capacity.  We discussed different extreme health states that he could experience, and reviewed what kind of medical care he would want in those situations.  The patient communicated that if he were comatose and had little chance of a meaningful recovery, he would not want machines/life-sustaining treatments used. In addition to the above preference, other important end-of-life issues for the patient include no other issues.  Advanced care planning paperwork given to patient to bring home and discuss with the family.  Once signed, patient should bring form back for for the purposes of entering it into the medical record.  I spent a total of 5 minutes engaging the patient in this advance care planning discussion.              Current Outpatient Medications   Medication Instructions    abiraterone (ZYTIGA) 1,000 mg, Oral    amLODIPine-benazepriL (LOTREL) 10-40 mg per capsule 1 capsule, Oral, Daily    aspirin (ECOTRIN) 81 MG EC tablet Aspir-Low 81 mg tablet,delayed release   Take 1 tablet every day by oral route.    atorvastatin (LIPITOR) 40 mg, Oral    calcium phosphate trib/vit D3 (CALCIUM PHOSPHATE-VITAMIN D3 ORAL) 1 capsule, Oral, 2 times daily    carvediloL (COREG) 25 mg, Oral, 2 times daily    clopidogreL (PLAVIX) 75 mg, Oral    finasteride (PROSCAR) 5 mg, Oral     FLUoxetine 40 mg, Oral, Daily    furosemide (LASIX) 20 mg, Oral, Daily    potassium chloride SA (K-DUR,KLOR-CON) 20 MEQ tablet 20 mEq, Oral, Daily    predniSONE (DELTASONE) 5 mg, Oral    tamsulosin (FLOMAX) 0.4 mg Cap 1 capsule, Oral        Follow up in about 6 months (around 5/30/2024) for Chronic condition F/up. In addition to their scheduled follow up, the patient has also been instructed to follow up on as needed basis.

## 2023-11-29 NOTE — ASSESSMENT & PLAN NOTE
Component Ref Range & Units 9 d ago   Hemoglobin A1c <=7.0 % 5.8   Estimated Average Glucose mg/dL 119.8        Current average blood sugar falls into a range that is determined to be prediabetes.  In other words, if your blood sugar goes any higher, you could become diabetic.  Therefore, it is essential that we start a treatment plan that decreases your average blood sugar.  Follow a healthy meal plan.  This includes eating lean proteins, complex carbohydrates in moderation (rice, bread, pasta, potatoes), fresh fruits and vegetables, low-fat dairy products and healthy fats such as avocado, olive, canola or vegetable oil.  Simple carbohydrates such as sweets, containing sugar should be avoided or consumed in controlled amounts.    Physical activity as recommended, 30 more minutes up to 5 days a week.  This could be something as simple as brisk walking or biking.    Weight loss is also beneficial and may reverse diabetes or prediabetes.

## 2023-11-29 NOTE — ASSESSMENT & PLAN NOTE
Component Ref Range & Units 9 d ago  (11/20/23) 3 mo ago  (8/28/23) 4 mo ago  (7/7/23) 4 mo ago  (7/7/23) 4 mo ago  (7/6/23) 4 mo ago  (7/6/23)   Sodium Level 136 - 145 mmol/L 140 138 137 137 139 136   Potassium Level 3.5 - 5.1 mmol/L 4.0 3.0 Low        Chloride 98 - 107 mmol/L 100 97 Low  100 R 101 R 104 R 104 R   Carbon Dioxide 23 - 31 mmol/L 33 High  31 29 R 25 R 27 R 23 R   Glucose Level 82 - 115 mg/dL 115 130 High        Blood Urea Nitrogen 8.4 - 25.7 mg/dL 14.8 12.6 16 R 13 R 13 R 13 R   Creatinine 0.73 - 1.18 mg/dL 0.92 0.96 0.92 R 0.90 R 0.90 R 0.81 R        Most recent potassium level normal, continue level of potassium supplementation.

## 2023-11-30 ENCOUNTER — OFFICE VISIT (OUTPATIENT)
Dept: PRIMARY CARE CLINIC | Facility: CLINIC | Age: 68
End: 2023-11-30
Payer: MEDICARE

## 2023-11-30 VITALS
BODY MASS INDEX: 25.48 KG/M2 | OXYGEN SATURATION: 99 % | DIASTOLIC BLOOD PRESSURE: 60 MMHG | HEIGHT: 71 IN | SYSTOLIC BLOOD PRESSURE: 102 MMHG | HEART RATE: 68 BPM | RESPIRATION RATE: 18 BRPM | WEIGHT: 182 LBS

## 2023-11-30 DIAGNOSIS — E87.6 HYPOKALEMIA: Chronic | ICD-10-CM

## 2023-11-30 DIAGNOSIS — Z12.5 SCREENING FOR PROSTATE CANCER: ICD-10-CM

## 2023-11-30 DIAGNOSIS — D64.9 NORMOCYTIC ANEMIA: Chronic | ICD-10-CM

## 2023-11-30 DIAGNOSIS — R73.03 PREDIABETES: Chronic | ICD-10-CM

## 2023-11-30 DIAGNOSIS — I10 PRIMARY HYPERTENSION: Chronic | ICD-10-CM

## 2023-11-30 DIAGNOSIS — F41.8 ANXIETY WITH DEPRESSION: Chronic | ICD-10-CM

## 2023-11-30 DIAGNOSIS — E78.5 DYSLIPIDEMIA: Chronic | ICD-10-CM

## 2023-11-30 DIAGNOSIS — I65.21 STENOSIS OF RIGHT CAROTID ARTERY: Chronic | ICD-10-CM

## 2023-11-30 DIAGNOSIS — Z00.00 MEDICARE ANNUAL WELLNESS VISIT, SUBSEQUENT: Primary | ICD-10-CM

## 2023-11-30 DIAGNOSIS — Z95.1 HISTORY OF CORONARY ARTERY BYPASS GRAFT: Chronic | ICD-10-CM

## 2023-11-30 PROCEDURE — 99214 PR OFFICE/OUTPT VISIT, EST, LEVL IV, 30-39 MIN: ICD-10-PCS | Mod: ,,, | Performed by: GENERAL PRACTICE

## 2023-11-30 PROCEDURE — 99214 OFFICE O/P EST MOD 30 MIN: CPT | Mod: ,,, | Performed by: GENERAL PRACTICE

## 2023-11-30 NOTE — ASSESSMENT & PLAN NOTE
Component Ref Range & Units 10 d ago 4 mo ago   WBC 4.50 - 11.50 x10(3)/mcL 6.03    RBC 4.70 - 6.10 x10(6)/mcL 4.33 Low     Hgb 14.0 - 18.0 g/dL 13.7 Low  11.5 Low    Hct 42.0 - 52.0 % 39.4 Low     MCV 80.0 - 94.0 fL 91.0    MCH 27.0 - 31.0 pg 31.6 High     MCHC 33.0 - 36.0 g/dL 34.8    RDW 11.5 - 17.0 % 13.8    Platelet 130 - 400 x10(3)/mcL 217      Mild anemia, we will monitor annually.

## 2024-05-30 NOTE — PROGRESS NOTES
"Subjective:       Patient ID: Curly Schwartz is a 68 y.o. male.    Chief Complaint: Hypertension    Patient presents to the clinic today for chronic condition follow-up.  Doing well, no specific complaints, tolerating all medications, reporting no significant side effects or problems.    Last wellness exam was 11/30/2023.    Chronic conditions being treated include but are not limited to primary HTN, dyslipidemia, prediabetes, mood disorder, prostate cancer.    Does have a history of recurrent cerumen impactions, having some hearing loss.          Review of Systems   Constitutional: Negative.  Negative for fatigue and fever.   HENT:  Positive for hearing loss. Negative for sore throat and trouble swallowing.    Eyes: Negative.  Negative for redness and visual disturbance.   Respiratory: Negative.  Negative for chest tightness and shortness of breath.    Cardiovascular: Negative.  Negative for chest pain.   Gastrointestinal: Negative.  Negative for abdominal pain, blood in stool and nausea.   Endocrine: Negative.  Negative for cold intolerance, heat intolerance and polyuria.   Genitourinary: Negative.    Musculoskeletal: Negative.  Negative for arthralgias, gait problem and joint swelling.   Integumentary:  Negative for rash. Negative.   Neurological: Negative.  Negative for dizziness, weakness and headaches.   Psychiatric/Behavioral: Negative.  Negative for agitation and dysphoric mood.            Patient Care Team:  Jimbo Mora MD as PCP - General (Family Medicine)  MATTHIAS Chakraborty MD (Gastroenterology)  Lawson Lowery II, MD as Consulting Physician (Cardiology)  Drew Lockett MD as Consulting Physician (Urology)  Lewis Parmar MD as Consulting Physician (Urology)  Objective:   Visit Vitals  /68   Pulse 67   Resp 18   Ht 5' 11" (1.803 m)   Wt 85.3 kg (188 lb)   SpO2 99%   BMI 26.22 kg/m²        Physical Exam  Constitutional:       Appearance: Normal appearance.   HENT:      Head: " Normocephalic.      Right Ear: There is impacted cerumen.      Left Ear: There is impacted cerumen.      Mouth/Throat:      Mouth: Mucous membranes are moist.      Pharynx: Oropharynx is clear.   Eyes:      Conjunctiva/sclera: Conjunctivae normal.      Pupils: Pupils are equal, round, and reactive to light.   Cardiovascular:      Rate and Rhythm: Normal rate and regular rhythm.      Heart sounds: Normal heart sounds.   Pulmonary:      Effort: Pulmonary effort is normal.      Breath sounds: Normal breath sounds.   Abdominal:      General: Abdomen is flat.      Palpations: Abdomen is soft.   Musculoskeletal:         General: Normal range of motion.      Cervical back: Neck supple.   Skin:     General: Skin is warm and dry.   Neurological:      General: No focal deficit present.      Mental Status: He is alert and oriented to person, place, and time.   Psychiatric:         Mood and Affect: Mood normal.         Behavior: Behavior normal.         Thought Content: Thought content normal.         Judgment: Judgment normal.           Lab Results   Component Value Date     11/20/2023    K 4.0 11/20/2023     07/07/2023    CO2 33 (H) 11/20/2023    BUN 14.8 11/20/2023    CREATININE 0.92 11/20/2023    CALCIUM 9.5 11/20/2023    ALBUMIN 3.9 11/20/2023    BILITOT 1.0 11/20/2023    ALKPHOS 64 11/20/2023    AST 17 11/20/2023    ALT 12 11/20/2023    ANIONGAP 8 07/07/2023    ESTGFRAFRICA 90 07/07/2023    EGFRNORACEVR >60 11/20/2023     Lab Results   Component Value Date    WBC 6.03 11/20/2023    RBC 4.33 (L) 11/20/2023    HGB 13.7 (L) 11/20/2023    HCT 39.4 (L) 11/20/2023    MCV 91.0 11/20/2023    RDW 13.8 11/20/2023     11/20/2023      Lab Results   Component Value Date    CHOL 141 11/20/2023    TRIG 89 11/20/2023    HDL 60 11/20/2023    LDL 63.00 11/20/2023    TOTALCHOLEST 2 11/20/2023     Lab Results   Component Value Date    TSH 1.303 11/20/2023     Lab Results   Component Value Date    HGBA1C 5.8 11/20/2023          Assessment:       ICD-10-CM ICD-9-CM   1. Primary hypertension  I10 401.9   2. Dyslipidemia  E78.5 272.4   3. Prediabetes  R73.03 790.29   4. Malignant tumor of prostate  C61 185   5. History of coronary artery bypass graft  Z95.1 V45.81   6. Anxiety with depression  F41.8 300.4   7. Lower extremity edema  R60.0 782.3   8. Normocytic anemia  D64.9 285.9   9. Urge incontinence of urine  N39.41 788.31   10. Stenosis of right carotid artery  I65.21 433.10   11. Bilateral hearing loss due to cerumen impaction  H61.23 389.8     380.4       Current Outpatient Medications   Medication Instructions    abiraterone (ZYTIGA) 1,000 mg, Oral    amLODIPine-benazepriL (LOTREL) 10-40 mg per capsule 1 capsule, Oral, Daily    aspirin (ECOTRIN) 81 MG EC tablet Aspir-Low 81 mg tablet,delayed release   Take 1 tablet every day by oral route.    atorvastatin (LIPITOR) 40 mg, Oral    calcium phosphate trib/vit D3 (CALCIUM PHOSPHATE-VITAMIN D3 ORAL) 1 capsule, Oral, 2 times daily    carvediloL (COREG) 25 mg, Oral, 2 times daily    clopidogreL (PLAVIX) 75 mg, Oral    finasteride (PROSCAR) 5 mg, Oral    FLUoxetine 40 mg, Oral, Daily    furosemide (LASIX) 20 mg, Oral, Daily    potassium chloride SA (K-DUR,KLOR-CON) 20 MEQ tablet 20 mEq, Oral, Daily    predniSONE (DELTASONE) 5 mg, Oral    tamsulosin (FLOMAX) 0.4 mg Cap 1 capsule, Oral     Plan:     Problem List Items Addressed This Visit          Psychiatric    Anxiety with depression (Chronic)    Overview     Prescribed fluoxetine 20 mg daily.    Patient reports stability of moods, and effectiveness of medications, including no agitation, significant somnolence, or significant bouts of anxiety or depression.  Patient is not having any sleep disturbance.  Current treatment plan will continue, with plans to discuss any significant changes in patient's status if necessary if anything changes in the future.            ENT    Bilateral hearing loss due to cerumen impaction    Current  Assessment & Plan     Procedure note:  After informed consent was obtained, using a mixture of peroxide and water, the affected ear(s) was lavaged successfully by myself/staff, with removal of cerumen.  Post lavage examination revealed a clear external canal, and an un damaged intact tympanic membrane.         Relevant Orders    Ear Cerumen Removal (Completed)       Cardiac/Vascular    Stenosis of right carotid artery (Chronic)    Overview     He does have a cardiologist, Dr. Lowery, whom he sees regularly.         Primary hypertension - Primary (Chronic)    Overview     Prescribed amlodipine benazepril 10-40 mg daily, Coreg 25 mg b.i.d..    Blood pressures appear to be adequately controlled with current treatment plan, including prescription blood pressure medication.  Medication(s) appear to be effective at current dosages, and are not causing any side effects or problems.  Continue medical management and continue home blood pressure checks.  Average blood pressures at home should be no greater than 130/80.  Patient instructed to contact the clinic if blood pressures become elevated at any point prior to next clinic visit.         Relevant Orders    Comprehensive Metabolic Panel    CBC Auto Differential    TSH    History of coronary artery bypass graft (Chronic)    Overview     Sees Dr. Lowery.  History of CABG several years ago.    Patient sees a specialist for this condition.  This medical condition appears to be stable, patient will continue regular follow-up with the treating specialist.         Dyslipidemia (Chronic)    Overview     Prescribed Lipitor 40 mg daily.    Most recent cholesterol/lipid blood testing shows adequate control, continue current treatment plan, including prescription medications if prescribed, and/or diet high in fiber, low in saturated fats as discussed during clinic visit.         Relevant Orders    Lipid Panel       Renal/    Urge incontinence of urine (Chronic)    Overview     Sees  Urology, Dr. Lockett.    Patient sees a specialist for this condition.  This medical condition appears to be stable, patient will continue regular follow-up with the treating specialist.            Oncology    Malignant tumor of prostate (Chronic)    Overview     History of prostate cancer, sees his urologist, Dr. Lockett.         Normocytic anemia (Chronic)    Overview     Probably anemia of chronic disease, appears mild, we will continue to monitor.            Endocrine    Prediabetes (Chronic)    Overview     Patient has prediabetes and is not prescribed medication.  Patient's prediabetes is treated and controlled adequately using conservative means, specifically lifestyle modification, dietary changes, and/or increase in activity/exercise.         Relevant Orders    Hemoglobin A1C       Other    Lower extremity edema (Chronic)    Overview     Chronic lower extremity edema, worse on the left side, the vein was removed for his CABG. Takes furosemide 20 mg daily, continue current treatment plan.                     Follow up in about 6 months (around 12/5/2024) for Medicare Wellness.

## 2024-05-31 ENCOUNTER — OFFICE VISIT (OUTPATIENT)
Dept: PRIMARY CARE CLINIC | Facility: CLINIC | Age: 69
End: 2024-05-31
Payer: MEDICARE

## 2024-05-31 VITALS
OXYGEN SATURATION: 99 % | SYSTOLIC BLOOD PRESSURE: 122 MMHG | WEIGHT: 188 LBS | HEART RATE: 67 BPM | BODY MASS INDEX: 26.32 KG/M2 | HEIGHT: 71 IN | DIASTOLIC BLOOD PRESSURE: 68 MMHG | RESPIRATION RATE: 18 BRPM

## 2024-05-31 DIAGNOSIS — H61.23 BILATERAL HEARING LOSS DUE TO CERUMEN IMPACTION: ICD-10-CM

## 2024-05-31 DIAGNOSIS — I65.21 STENOSIS OF RIGHT CAROTID ARTERY: Chronic | ICD-10-CM

## 2024-05-31 DIAGNOSIS — Z95.1 HISTORY OF CORONARY ARTERY BYPASS GRAFT: Chronic | ICD-10-CM

## 2024-05-31 DIAGNOSIS — N39.41 URGE INCONTINENCE OF URINE: Chronic | ICD-10-CM

## 2024-05-31 DIAGNOSIS — R60.0 LOWER EXTREMITY EDEMA: Chronic | ICD-10-CM

## 2024-05-31 DIAGNOSIS — I10 PRIMARY HYPERTENSION: Primary | Chronic | ICD-10-CM

## 2024-05-31 DIAGNOSIS — C61 MALIGNANT TUMOR OF PROSTATE: Chronic | ICD-10-CM

## 2024-05-31 DIAGNOSIS — D64.9 NORMOCYTIC ANEMIA: Chronic | ICD-10-CM

## 2024-05-31 DIAGNOSIS — E78.5 DYSLIPIDEMIA: Chronic | ICD-10-CM

## 2024-05-31 DIAGNOSIS — R73.03 PREDIABETES: Chronic | ICD-10-CM

## 2024-05-31 DIAGNOSIS — F41.8 ANXIETY WITH DEPRESSION: Chronic | ICD-10-CM

## 2024-05-31 PROCEDURE — 69209 REMOVE IMPACTED EAR WAX UNI: CPT | Mod: 50,,, | Performed by: GENERAL PRACTICE

## 2024-05-31 PROCEDURE — 99214 OFFICE O/P EST MOD 30 MIN: CPT | Mod: 25,,, | Performed by: GENERAL PRACTICE

## 2024-05-31 NOTE — ASSESSMENT & PLAN NOTE
Procedure note:  After informed consent was obtained, using a mixture of peroxide and water, the affected ear(s) was lavaged successfully by myself/staff, with removal of cerumen.  Post lavage examination revealed a clear external canal, and an un damaged intact tympanic membrane.

## 2024-05-31 NOTE — PROCEDURES
"Ear Cerumen Removal    Date/Time: 5/31/2024 9:30 AM    Performed by: Jimbo Mora MD  Authorized by: Jimbo Mora MD    Time out: Immediately prior to procedure a "time out" was called to verify the correct patient, procedure, equipment, support staff and site/side marked as required.    Consent Done?:  Yes (Verbal)  Location details:  Both ears  Procedure type: irrigation    Cerumen  Removal Results:  Cerumen completely removed  Patient tolerance:  Patient tolerated the procedure well with no immediate complications    "

## 2024-08-05 ENCOUNTER — TELEPHONE (OUTPATIENT)
Dept: PRIMARY CARE CLINIC | Facility: CLINIC | Age: 69
End: 2024-08-05
Payer: MEDICARE

## 2024-08-05 DIAGNOSIS — F41.8 ANXIETY WITH DEPRESSION: Chronic | ICD-10-CM

## 2024-08-05 RX ORDER — FLUOXETINE HYDROCHLORIDE 40 MG/1
40 CAPSULE ORAL DAILY
Qty: 90 CAPSULE | Refills: 3 | Status: SHIPPED | OUTPATIENT
Start: 2024-08-05 | End: 2025-08-05

## 2024-08-26 DIAGNOSIS — E87.6 HYPOKALEMIA: Chronic | ICD-10-CM

## 2024-08-26 DIAGNOSIS — R60.0 LOWER EXTREMITY EDEMA: Chronic | ICD-10-CM

## 2024-08-26 DIAGNOSIS — I10 PRIMARY HYPERTENSION: Chronic | ICD-10-CM

## 2024-08-26 RX ORDER — FUROSEMIDE 20 MG/1
20 TABLET ORAL DAILY
Qty: 90 TABLET | Refills: 3 | Status: SHIPPED | OUTPATIENT
Start: 2024-08-26

## 2024-08-26 RX ORDER — AMLODIPINE AND BENAZEPRIL HYDROCHLORIDE 10; 40 MG/1; MG/1
1 CAPSULE ORAL DAILY
Qty: 90 CAPSULE | Refills: 3 | Status: SHIPPED | OUTPATIENT
Start: 2024-08-26

## 2024-08-26 RX ORDER — POTASSIUM CHLORIDE 20 MEQ/1
20 TABLET, EXTENDED RELEASE ORAL DAILY
Qty: 90 TABLET | Refills: 3 | Status: SHIPPED | OUTPATIENT
Start: 2024-08-26 | End: 2025-08-26

## 2024-09-22 ENCOUNTER — OFFICE VISIT (OUTPATIENT)
Dept: URGENT CARE | Facility: CLINIC | Age: 69
End: 2024-09-22
Payer: MEDICARE

## 2024-09-22 VITALS
DIASTOLIC BLOOD PRESSURE: 79 MMHG | TEMPERATURE: 99 F | RESPIRATION RATE: 16 BRPM | BODY MASS INDEX: 26.32 KG/M2 | OXYGEN SATURATION: 97 % | WEIGHT: 188 LBS | HEIGHT: 71 IN | SYSTOLIC BLOOD PRESSURE: 119 MMHG | HEART RATE: 69 BPM

## 2024-09-22 DIAGNOSIS — R33.9 URINARY RETENTION: Primary | ICD-10-CM

## 2024-09-22 PROCEDURE — 99213 OFFICE O/P EST LOW 20 MIN: CPT | Mod: ,,, | Performed by: FAMILY MEDICINE

## 2024-09-22 NOTE — PROGRESS NOTES
"Subjective:      Patient ID: Curly Schwartz is a 68 y.o. male.    Vitals:  height is 5' 11" (1.803 m) and weight is 85.3 kg (188 lb). His tympanic temperature is 99.2 °F (37.3 °C). His blood pressure is 119/79 and his pulse is 69. His respiration is 16 and oxygen saturation is 97%.     Chief Complaint: Hematuria     Patient is a 68 y.o. male who presents to urgent care with complaints of blood in urine and unable to fully empty bladder x this morning. Pt states he has a history of prostate cancer.  States he is unable to urinate at this point.  Starting to have bladder spasms and lower abdominal pain.  Denies any back pain.  Denies any fever.  States yesterday he had no issues voiding.    Constitution: Negative.   HENT: Negative.     Neck: neck negative.   Cardiovascular: Negative.    Eyes: Negative.    Respiratory: Negative.     Gastrointestinal: Negative.    Genitourinary:  Positive for urine decreased and hematuria.   Musculoskeletal: Negative.    Skin: Negative.    Allergic/Immunologic: Negative.    Neurological: Negative.    Hematologic/Lymphatic: Negative.       Objective:     Physical Exam   Constitutional: He is oriented to person, place, and time.  Non-toxic appearance. He does not appear ill. No distress.   HENT:   Head: Normocephalic and atraumatic.   Eyes: Conjunctivae are normal.   Pulmonary/Chest: Effort normal. No respiratory distress.   Abdominal: Normal appearance. There is abdominal tenderness (tenderness to palpation and firmness of the suprapubic area.). There is no left CVA tenderness and no right CVA tenderness.   Neurological: He is alert and oriented to person, place, and time.   Skin: Skin is not diaphoretic.   Psychiatric: His behavior is normal. Mood, judgment and thought content normal.   Vitals reviewed.         Previous History      Review of patient's allergies indicates:   Allergen Reactions    Sulfa (sulfonamide antibiotics) Rash       Past Medical History:   Diagnosis Date    " "Anxiety     Arteriosclerosis of coronary artery 5/21/2023    Depression     Hyperlipidemia     Hypertension     Incomplete emptying of bladder 01/27/2023    Urinary obstruction due to nodular prostate 1/27/2023     Current Outpatient Medications   Medication Instructions    abiraterone (ZYTIGA) 1,000 mg, Oral    amLODIPine-benazepriL (LOTREL) 10-40 mg per capsule 1 capsule, Oral, Daily    aspirin (ECOTRIN) 81 MG EC tablet Aspir-Low 81 mg tablet,delayed release   Take 1 tablet every day by oral route.    atorvastatin (LIPITOR) 40 mg, Oral    calcium phosphate trib/vit D3 (CALCIUM PHOSPHATE-VITAMIN D3 ORAL) 1 capsule, Oral, 2 times daily    carvediloL (COREG) 25 mg, Oral, 2 times daily    clopidogreL (PLAVIX) 75 mg, Oral    finasteride (PROSCAR) 5 mg, Oral    FLUoxetine 40 mg, Oral, Daily    furosemide (LASIX) 20 mg, Oral, Daily    potassium chloride SA (K-DUR,KLOR-CON) 20 MEQ tablet 20 mEq, Oral, Daily    predniSONE (DELTASONE) 5 mg, Oral    tamsulosin (FLOMAX) 0.4 mg Cap 1 capsule, Oral     Past Surgical History:   Procedure Laterality Date    CORONARY ARTERY BYPASS GRAFT  2000    CORONARY STENT PLACEMENT      TRANSURETHRAL RESECTION OF PROSTATE  07/06/2023    Speeg     Family History   Problem Relation Name Age of Onset    Cancer Mother Mechelle Schwartz         Kidney    Depression Mother Mechelle Schwartz     Hyperlipidemia Mother Mechelle Schwartz     Heart disease Father Sergio Schwartz     Hyperlipidemia Father Sergio Schwartz        Social History     Tobacco Use    Smoking status: Never    Smokeless tobacco: Never   Substance Use Topics    Alcohol use: Not Currently     Alcohol/week: 2.0 standard drinks of alcohol     Types: 2 Cans of beer per week    Drug use: Never        Physical Exam      Vital Signs Reviewed   /79   Pulse 69   Temp 99.2 °F (37.3 °C) (Tympanic)   Resp 16   Ht 5' 11" (1.803 m)   Wt 85.3 kg (188 lb)   SpO2 97%   BMI 26.22 kg/m²        Procedures    Procedures     " Labs     Results for orders placed or performed in visit on 11/20/23   Hepatitis C Antibody   Result Value Ref Range    Hep C Ab Interp Nonreactive Nonreactive   Comprehensive Metabolic Panel   Result Value Ref Range    Sodium 140 136 - 145 mmol/L    Potassium 4.0 3.5 - 5.1 mmol/L    Chloride 100 98 - 107 mmol/L    CO2 33 (H) 23 - 31 mmol/L    Glucose 115 82 - 115 mg/dL    Blood Urea Nitrogen 14.8 8.4 - 25.7 mg/dL    Creatinine 0.92 0.73 - 1.18 mg/dL    Calcium 9.5 8.8 - 10.0 mg/dL    Protein Total 6.9 5.8 - 7.6 gm/dL    Albumin 3.9 3.4 - 4.8 g/dL    Globulin 3.0 2.4 - 3.5 gm/dL    Albumin/Globulin Ratio 1.3 1.1 - 2.0 ratio    Bilirubin Total 1.0 <=1.5 mg/dL    ALP 64 40 - 150 unit/L    ALT 12 0 - 55 unit/L    AST 17 5 - 34 unit/L    eGFR >60 mls/min/1.73/m2   Lipid Panel   Result Value Ref Range    Cholesterol Total 141 <=200 mg/dL    HDL Cholesterol 60 35 - 60 mg/dL    Triglyceride 89 34 - 140 mg/dL    Cholesterol/HDL Ratio 2 0 - 5    Very Low Density Lipoprotein 18     LDL Cholesterol 63.00 50.00 - 140.00 mg/dL   TSH   Result Value Ref Range    TSH 1.303 0.350 - 4.940 uIU/mL   Hemoglobin A1C   Result Value Ref Range    Hemoglobin A1c 5.8 <=7.0 %    Estimated Average Glucose 119.8 mg/dL   CBC with Differential   Result Value Ref Range    WBC 6.03 4.50 - 11.50 x10(3)/mcL    RBC 4.33 (L) 4.70 - 6.10 x10(6)/mcL    Hgb 13.7 (L) 14.0 - 18.0 g/dL    Hct 39.4 (L) 42.0 - 52.0 %    MCV 91.0 80.0 - 94.0 fL    MCH 31.6 (H) 27.0 - 31.0 pg    MCHC 34.8 33.0 - 36.0 g/dL    RDW 13.8 11.5 - 17.0 %    Platelet 217 130 - 400 x10(3)/mcL    MPV 8.1 7.4 - 10.4 fL    Neut % 74.5 %    Lymph % 6.8 %    Mono % 15.9 %    Eos % 1.3 %    Basophil % 1.0 %    Lymph # 0.41 (L) 0.6 - 4.6 x10(3)/mcL    Neut # 4.49 2.1 - 9.2 x10(3)/mcL    Mono # 0.96 0.1 - 1.3 x10(3)/mcL    Eos # 0.08 0 - 0.9 x10(3)/mcL    Baso # 0.06 <=0.2 x10(3)/mcL    IG# 0.03 0 - 0.04 x10(3)/mcL    IG% 0.5 %    NRBC% 0.0 %       Assessment:     1. Urinary retention         Plan:   As we discussed, it is recommended that you present to the ER now for further evaluation to prevent a delay in care.         Urinary retention  -     Refer to Emergency Dept.

## 2024-11-14 ENCOUNTER — TELEPHONE (OUTPATIENT)
Dept: PRIMARY CARE CLINIC | Facility: CLINIC | Age: 69
End: 2024-11-14
Payer: MEDICARE

## 2024-11-14 NOTE — TELEPHONE ENCOUNTER
----- Message from [employer] sent at 11/11/2024 11:22 AM CST -----  .Who Called: Lauren Nurse with  [facility]     Caller is requesting assistance/information from provider's office.    Symptoms (please be specific): n/a   How long has patient had these symptoms:  n/a  List of preferred pharmacies on file (remove unneeded): [unfilled]  If different, enter pharmacy into here including location and phone number: n/a      Preferred Method of Contact: Phone Call  Patient's Preferred Phone Number on File:    Best Call Back Number, if different:-[phone]  Additional Information: Caller is advising that the pt fell while getting off of the toilet either  yesterday or day before yesterday and hit his face on the floor. The pt has a black eye on the right side and its bruised. The pt states he feels fie and didn't lose consciousness

## 2024-11-25 ENCOUNTER — TELEPHONE (OUTPATIENT)
Dept: PRIMARY CARE CLINIC | Facility: CLINIC | Age: 69
End: 2024-11-25
Payer: MEDICARE

## 2024-11-25 NOTE — TELEPHONE ENCOUNTER
----- Message from Crystal sent at 11/25/2024 12:24 PM CST -----  .Who Called: Gladys with [facility]    Caller is requesting assistance/information from provider's office.    Symptoms (please be specific): n/a   How long has patient had these symptoms:  n/a  List of preferred pharmacies on file (remove unneeded): [unfilled]  If different, enter pharmacy into here including location and phone number: n/a      Preferred Method of Contact: Phone Call    Patient's Preferred Phone Number on File: [phone]     Best Call Back Number, if different: [phone] (Gladys)    Additional Information: Called stating pt is being d/c today from home health. Pt is no longer home bound. Please advise, thank you.

## 2024-12-04 ENCOUNTER — CLINICAL SUPPORT (OUTPATIENT)
Dept: PRIMARY CARE CLINIC | Facility: CLINIC | Age: 69
End: 2024-12-04
Payer: MEDICARE

## 2024-12-04 DIAGNOSIS — R73.03 PREDIABETES: Chronic | ICD-10-CM

## 2024-12-04 DIAGNOSIS — E78.5 DYSLIPIDEMIA: Chronic | ICD-10-CM

## 2024-12-04 DIAGNOSIS — I10 PRIMARY HYPERTENSION: ICD-10-CM

## 2024-12-04 DIAGNOSIS — Z00.00 MEDICARE ANNUAL WELLNESS VISIT, INITIAL: Primary | ICD-10-CM

## 2024-12-04 LAB
ALBUMIN SERPL-MCNC: 3.2 G/DL (ref 3.4–4.8)
ALBUMIN/GLOB SERPL: 1.1 RATIO (ref 1.1–2)
ALP SERPL-CCNC: 78 UNIT/L (ref 40–150)
ALT SERPL-CCNC: 18 UNIT/L (ref 0–55)
ANION GAP SERPL CALC-SCNC: 7 MEQ/L
AST SERPL-CCNC: 21 UNIT/L (ref 5–34)
BASOPHILS # BLD AUTO: 0.04 X10(3)/MCL
BASOPHILS NFR BLD AUTO: 0.7 %
BILIRUB SERPL-MCNC: 0.4 MG/DL
BUN SERPL-MCNC: 15 MG/DL (ref 8.4–25.7)
CALCIUM SERPL-MCNC: 9 MG/DL (ref 8.8–10)
CHLORIDE SERPL-SCNC: 100 MMOL/L (ref 98–107)
CHOLEST SERPL-MCNC: 126 MG/DL
CHOLEST/HDLC SERPL: 2 {RATIO} (ref 0–5)
CO2 SERPL-SCNC: 27 MMOL/L (ref 23–31)
CREAT SERPL-MCNC: 0.99 MG/DL (ref 0.72–1.25)
CREAT/UREA NIT SERPL: 15
EOSINOPHIL # BLD AUTO: 0.14 X10(3)/MCL (ref 0–0.9)
EOSINOPHIL NFR BLD AUTO: 2.5 %
ERYTHROCYTE [DISTWIDTH] IN BLOOD BY AUTOMATED COUNT: 13.9 % (ref 11.5–17)
EST. AVERAGE GLUCOSE BLD GHB EST-MCNC: 99.7 MG/DL
GFR SERPLBLD CREATININE-BSD FMLA CKD-EPI: >60 ML/MIN/1.73/M2
GLOBULIN SER-MCNC: 2.9 GM/DL (ref 2.4–3.5)
GLUCOSE SERPL-MCNC: 121 MG/DL (ref 82–115)
HBA1C MFR BLD: 5.1 %
HCT VFR BLD AUTO: 30.2 % (ref 42–52)
HDLC SERPL-MCNC: 54 MG/DL (ref 35–60)
HGB BLD-MCNC: 10.2 G/DL (ref 14–18)
IMM GRANULOCYTES # BLD AUTO: 0.03 X10(3)/MCL (ref 0–0.04)
IMM GRANULOCYTES NFR BLD AUTO: 0.5 %
LDLC SERPL CALC-MCNC: 56 MG/DL (ref 50–140)
LYMPHOCYTES # BLD AUTO: 0.61 X10(3)/MCL (ref 0.6–4.6)
LYMPHOCYTES NFR BLD AUTO: 11 %
MCH RBC QN AUTO: 29.4 PG (ref 27–31)
MCHC RBC AUTO-ENTMCNC: 33.8 G/DL (ref 33–36)
MCV RBC AUTO: 87 FL (ref 80–94)
MONOCYTES # BLD AUTO: 0.71 X10(3)/MCL (ref 0.1–1.3)
MONOCYTES NFR BLD AUTO: 12.8 %
NEUTROPHILS # BLD AUTO: 4.01 X10(3)/MCL (ref 2.1–9.2)
NEUTROPHILS NFR BLD AUTO: 72.5 %
NRBC BLD AUTO-RTO: 0 %
PLATELET # BLD AUTO: 258 X10(3)/MCL (ref 130–400)
PMV BLD AUTO: 8 FL (ref 7.4–10.4)
POTASSIUM SERPL-SCNC: 3.8 MMOL/L (ref 3.5–5.1)
PROT SERPL-MCNC: 6.1 GM/DL (ref 5.8–7.6)
RBC # BLD AUTO: 3.47 X10(6)/MCL (ref 4.7–6.1)
SODIUM SERPL-SCNC: 134 MMOL/L (ref 136–145)
TRIGL SERPL-MCNC: 82 MG/DL (ref 34–140)
TSH SERPL-ACNC: 0.9 UIU/ML (ref 0.35–4.94)
VLDLC SERPL CALC-MCNC: 16 MG/DL
WBC # BLD AUTO: 5.54 X10(3)/MCL (ref 4.5–11.5)

## 2024-12-04 PROCEDURE — 80053 COMPREHEN METABOLIC PANEL: CPT | Performed by: GENERAL PRACTICE

## 2024-12-04 PROCEDURE — 83036 HEMOGLOBIN GLYCOSYLATED A1C: CPT | Performed by: GENERAL PRACTICE

## 2024-12-04 PROCEDURE — 80061 LIPID PANEL: CPT | Performed by: GENERAL PRACTICE

## 2024-12-04 PROCEDURE — 85025 COMPLETE CBC W/AUTO DIFF WBC: CPT | Performed by: GENERAL PRACTICE

## 2024-12-04 PROCEDURE — 36415 COLL VENOUS BLD VENIPUNCTURE: CPT

## 2024-12-04 PROCEDURE — 84443 ASSAY THYROID STIM HORMONE: CPT | Performed by: GENERAL PRACTICE

## 2024-12-04 NOTE — PROGRESS NOTES
Patient verified name and .Patient here for nurse visit to draw AW labs with butterfly needle. Patient was drawn in right antecubital .No complications or issues occurred. Patient tolerated venipuncture well. Patient expressed no questions or concerns.

## 2024-12-08 PROBLEM — H61.23 BILATERAL HEARING LOSS DUE TO CERUMEN IMPACTION: Status: RESOLVED | Noted: 2024-05-31 | Resolved: 2024-12-08

## 2024-12-08 NOTE — PROGRESS NOTES
Patient ID: [id number]     Chief Complaint: Annual Exam      HPI:     Curly Schwartz is a 69 y.o. male here today for a Medicare Wellness. No other complaints today.       -------------------------------------    Anxiety    Arteriosclerosis of coronary artery    Depression    Hyperlipidemia    Hypertension    Incomplete emptying of bladder    Urinary obstruction due to nodular prostate        Past Surgical History:   Procedure Laterality Date    CORONARY ARTERY BYPASS GRAFT  2000    CORONARY STENT PLACEMENT      TRANSURETHRAL RESECTION OF PROSTATE  07/06/2023    [facility]       Review of patient's allergies indicates:   Allergen Reactions    Sulfa (sulfonamide antibiotics) Rash and Other (See Comments)       Outpatient Medications Marked as Taking for the 12/9/24 encounter (Office Visit) with Jimbo Mora MD   Medication Sig Dispense Refill    abiraterone (ZYTIGA) 250 mg Tab Take 1,000 mg by mouth.      amLODIPine-benazepriL (LOTREL) 10-40 mg per capsule Take 1 capsule by mouth once daily. 90 capsule 3    atorvastatin (LIPITOR) 40 MG tablet Take 40 mg by mouth.      calcium phosphate trib/vit D3 (CALCIUM PHOSPHATE-VITAMIN D3 ORAL) Take 1 capsule by mouth 2 (two) times a day.      carvediloL (COREG) 25 MG tablet Take 25 mg by mouth 2 (two) times daily.      finasteride (PROSCAR) 5 mg tablet Take 5 mg by mouth.      FLUoxetine 40 MG capsule Take 1 capsule (40 mg total) by mouth once daily. 90 capsule 3    furosemide (LASIX) 20 MG tablet Take 1 tablet (20 mg total) by mouth once daily. 90 tablet 3    olmesartan-amLODIPin-hcthiazid 40-10-25 mg Tab Take 1 tablet by mouth once daily.      potassium chloride SA (K-DUR,KLOR-CON) 20 MEQ tablet Take 1 tablet (20 mEq total) by mouth once daily. 90 tablet 3    predniSONE (DELTASONE) 5 MG tablet Take 5 mg by mouth.      tamsulosin (FLOMAX) 0.4 mg Cap Take 1 capsule by mouth.         Social History     Socioeconomic History    Marital status:    Tobacco Use    Smoking  status: Never    Smokeless tobacco: Never   Substance and Sexual Activity    Alcohol use: Not Currently     Alcohol/week: 2.0 standard drinks of alcohol     Types: 2 Cans of beer per week    Drug use: Never    Sexual activity: Not Currently     Partners: Female     Birth control/protection: None        Family History   Problem Relation Name Age of Onset    Cancer Mother Mechelle Schwartz         Kidney    Depression Mother Mechelle Schwartz     Hyperlipidemia Mother Mechelle Schwartz     Heart disease Father Sergio Schwartz     Hyperlipidemia Father Sergio Schwartz         Patient Care Team:  Jimbo Mora MD as PCP - General (Family Medicine)  MATTHIAS Chakraborty MD (Gastroenterology)  Lawson Lowery II, MD as Consulting Physician (Cardiology)  Drew Lockett MD as Consulting Physician (Urology)  Lewis Parmar MD as Consulting Physician (Urology)     Opioid Screening: Patient medication list reviewed, patient is not taking prescription opioids. Patient is not using additional opioids than prescribed. Patient is at low risk of substance abuse based on this opioid use history.       Subjective:     Review of Systems   Constitutional: Negative.  Negative for malaise/fatigue and weight loss.   HENT: Negative.     Eyes: Negative.    Respiratory: Negative.  Negative for shortness of breath.    Cardiovascular: Negative.  Negative for chest pain.   Gastrointestinal: Negative.    Genitourinary: Negative.    Musculoskeletal: Negative.    Skin: Negative.    Neurological: Negative.  Negative for focal weakness, weakness and headaches.   Endo/Heme/Allergies: Negative.    Psychiatric/Behavioral: Negative.  Negative for depression and memory loss. The patient is not nervous/anxious.          Patient Reported Health Risk Assessment  What [age] 70-79  Are you male or female?: Male  During the past four weeks, how much have you been bothered by emotional problems such as feeling anxious, depressed, irritable, sad, or downhearted  and blue?: Not at all  During the past five weeks, has your physical and/or emotional health limited your social activities with family, friends, neighbors, or groups?: Not at all  During the past four weeks, how much bodily pain have you generally had?: Mild pain  During the past four weeks, was someone available to help if you needed and wanted help?: Yes, as much as I wanted  During the past four weeks, what was the hardest physical activity you could do for at least two minutes?: Moderate  Can you get to places out of walking distance without help?  (For example, can you travel alone on buses or taxis, or drive your own car?): Yes  Can you go shopping for groceries or clothes without someone's help?: Yes  Can you prepare your own meals?: Yes  Can you do your own housework without help?: Yes  Because of any health problems, do you need the help of another person with your personal care needs such as eating, bathing, dressing, or getting around the house?: No  Can you handle your own money without help?: No  During the past four weeks, how would you rate your health in general?: Good  How have things been going for you during the past four weeks?: Pretty well  Are you having difficulties driving your car?: No  Do you always fasten your seat belt when you are in a car?: Yes, usually  How often in the past four weeks have you been bothered by falling or dizzy when standing up?: Sometimes  How often in the past four weeks have you been bothered by sexual problems?: Sometimes  How often in the past four weeks have you been bothered by trouble eating well?: Seldom  How often in the past four weeks have you been bothered by teeth or denture problems?: Sometimes  How often in the past four weeks have you been bothered with problems using the telephone?: Sometimes  How often in the past four weeks have you been bothered by tiredness or fatigue?: Seldom  Have you fallen two or more times in the past year?: No  Are you  "afraid of falling?: No  Are you a smoker?: No  During the past four weeks, how many drinks of wine, beer, or other alcoholic beverages did you have?: One drink or less per week  Do you exercise for about 20 minutes three or more days a week?: No, I usually do not exercise this much  Have you been given any information to help you with hazards in your house that might hurt you?: Yes  Have you been given any information to help you with keeping track of your medications?: Yes  How often do you have trouble taking medicines the way you've been told to take them?: I always take them as prescribed  How confident are you that you can control and manage most of your health problems?: Very confident  What is your race? (Check all that apply.):     Objective:     /75 (Patient Position: Sitting)   Pulse 67   Ht 5' 11" (1.803 m)   Wt 85.5 kg (188 lb 6.4 oz)   BMI 26.28 kg/m²     Physical Exam  Constitutional:       Appearance: Normal appearance.   HENT:      Head: Normocephalic.      Mouth/Throat:      Mouth: Mucous membranes are moist.      Pharynx: Oropharynx is clear.   Eyes:      Conjunctiva/sclera: Conjunctivae normal.      Pupils: Pupils are equal, round, and reactive to light.   Cardiovascular:      Rate and Rhythm: Normal rate and regular rhythm.      Heart sounds: Normal heart sounds.   Pulmonary:      Effort: Pulmonary effort is normal.      Breath sounds: Normal breath sounds.   Abdominal:      General: Abdomen is flat.      Palpations: Abdomen is soft.   Musculoskeletal:         General: Normal range of motion.      Cervical back: Neck supple.   Skin:     General: Skin is warm and dry.   Neurological:      General: No focal deficit present.      Mental Status: He is alert and oriented to person, place, and time.   Psychiatric:         Mood and Affect: Mood normal.         Behavior: Behavior normal.         Thought Content: Thought content normal.         Judgment: Judgment normal.         Lab " Results   Component Value Date     (L) 12/04/2024    K 3.8 12/04/2024     12/04/2024    CO2 27 12/04/2024    BUN 15.0 12/04/2024    CREATININE 0.99 12/04/2024    CALCIUM 9.0 12/04/2024    ALBUMIN 3.2 (L) 12/04/2024    BILITOT 0.4 12/04/2024    ALKPHOS 78 12/04/2024    AST 21 12/04/2024    ALT 18 12/04/2024    ANIONGAP 10 11/01/2024    ESTGFRAFRICA 87 11/01/2024    EGFRNORACEVR >60 12/04/2024     Lab Results   Component Value Date    WBC 5.54 12/04/2024    RBC 3.47 (L) 12/04/2024    HGB 10.2 (L) 12/04/2024    HCT 30.2 (L) 12/04/2024    MCV 87.0 12/04/2024    RDW 13.9 12/04/2024     12/04/2024      Lab Results   Component Value Date    CHOL 126 12/04/2024    TRIG 82 12/04/2024    HDL 54 12/04/2024    LDL 56.00 12/04/2024    TOTALCHOLEST 2 12/04/2024     Lab Results   Component Value Date    TSH 0.899 12/04/2024     Lab Results   Component Value Date    HGBA1C 5.1 12/04/2024            No data to display                  12/9/2024     9:30 AM   Fall Risk Assessment - Outpatient   Mobility Status Ambulatory   Number of falls 0   Identified as fall risk False           Depression Screening  Over the past two weeks, has the patient felt down, depressed, or hopeless?: No  Over the past two weeks, has the patient felt little interest or pleasure in doing things?: No  Functional Ability/Safety Screening  Was the patient's timed Up & Go test unsteady or longer than 30 seconds?: No  Does the patient need help with phone, transportation, shopping, preparing meals, housework, laundry, meds, or managing money?: No  Does the patient's home have rugs in the hallway, lack grab bars in the bathroom, lack handrails on the stairs or have poor lighting?: No  Have you noticed any hearing difficulties?: No  Assessment:       ICD-10-CM ICD-9-CM   1. Medicare annual wellness visit, subsequent  Z00.00 V70.0   2. Screening for prostate cancer  Z12.5 V76.44   3. Primary hypertension  I10 401.9   4. Prediabetes  R73.03 790.29    5. Dyslipidemia  E78.5 272.4   6. Normocytic anemia  D64.9 285.9   7. Malignant tumor of prostate  C61 185   8. History of coronary artery bypass graft  Z95.1 V45.81   9. Stenosis of right carotid artery  I65.21 433.10   10. Lower extremity edema  R60.0 782.3   11. Hypokalemia  E87.6 276.8        Plan:     Medicare Annual Wellness and Personalized Prevention Plan:   Fall Risk + Home Safety + Hearing Impairment + Depression Screen + Cognitive Impairment Screen + Health Risk Assessment all reviewed.       Health Maintenance Topics with due status: Not Due       Topic Last Completion Date    Hemoglobin A1c (Prediabetes) 12/04/2024    Lipid Panel 12/04/2024    High Dose Statin 12/09/2024      The patient's Health Maintenance was reviewed and the following appears to be due at this time:   Health Maintenance Due   Topic Date Due    COVID-19 Vaccine (1) Never done    Aspirin/Antiplatelet Therapy  Never done    Shingles Vaccine (1 of 2) Never done    Colorectal Cancer Screening  Never done    RSV Vaccine (Age 60+ and Pregnant patients) (1 - Risk 60-74 years 1-dose series) Never done    Pneumococcal Vaccines (Age 65+) (2 of 2 - PPSV23 or PCV20) 04/03/2019    TETANUS VACCINE  05/28/2024     Health risk assessment:  After review of the patient's medical record as it relates to health risk assessment over the next 5-10 years per recommendations of the [place]PSTF, it was determined that all pertinent recommendations have been appropriately addressed. The patient does not desire any further preventative care measures or screening tests at this time.  We will continue to reassess at each annual visit.    1. Medicare annual wellness visit, subsequent  Comments:  Overall health status was reviewed.  Good health habits reinforced.  Annual wellness exams recommended.    2. Screening for prostate cancer  Comments:  Sees Dr. Lockett, urology.  History of prostate cancer.    3. Primary hypertension  Overview:  Prescribed amlodipine  benazepril 10-40 mg daily, Coreg 25 mg b.i.d..    Blood pressures appear to be adequately controlled with current treatment plan, including prescription blood pressure medication.  Medication(s) appear to be effective at current dosages, and are not causing any side effects or problems.  Continue medical management and continue home blood pressure checks.  Average blood pressures at home should be no greater than 130/80.  Patient instructed to contact the clinic if blood pressures become elevated at any point prior to next clinic visit.    Medication will be continued at the current dosage.      4. Prediabetes  Overview:  Patient has prediabetes and is not prescribed medication.  Patient's prediabetes is treated and controlled adequately using conservative means, specifically lifestyle modification, dietary changes, and/or increase in activity/exercise.    Patient instructed to:  -Follow a healthy meal plan, including lean proteins, complex carbohydrates in moderation, fresh fruits and vegetables, low-fat dairy products, and healthy fats such as avocado, olive, canola, or vegetable oil.  -Simple carbohydrates such as sweets should be avoided or consumed uncontrolled amounts  -Physical activity recommended, 30 minutes from more up to five days a week.  This could be brisk walking or biking.  -Try to lose weight, this would be beneficial in reversing the condition of prediabetes, and/or preventing progression to full-blown diabetes.      5. Dyslipidemia  Overview:  Prescribed Lipitor 40 mg daily.    Most recent cholesterol/lipid blood testing shows adequate control, continue current treatment plan, including prescription medications if prescribed, and/or diet high in fiber, low in saturated fats as discussed during clinic visit.    Medication will be continued at current dosage.      6. Normocytic anemia  Overview:  Patient has a diagnosis of anemia, etiology uncertain.  Anemia has been mild and asymptomatic.  We will  continue to monitor regularly.  If anemia changes in any significant way, or if any significant symptoms develop which could be attributed to the anemia, more aggressive evaluation, treatment, and possibly referral will be considered.    Assessment & Plan:  Anemia slightly worse December 2024, we will recheck a CBC at his next visit, June 2025.           Component Ref Range & Units 5 d ago  (12/4/24) 1 yr ago  (11/20/23) 1 yr ago  (7/6/23)   WBC 4.50 - 11.50 x10(3)/mcL 5.54 6.03    RBC 4.70 - 6.10 x10(6)/mcL 3.47 Low  4.33 Low     Hgb 14.0 - 18.0 g/dL 10.2 Low  13.7 Low  11.5 Low    Hct 42.0 - 52.0 % 30.2 Low  39.4 Low     MCV 80.0 - 94.0 fL 87.0 91.0    MCH 27.0 - 31.0 pg 29.4 31.6 High     MCHC 33.0 - 36.0 g/dL 33.8 34.8    RDW 11.5 - 17.0 % 13.9 13.8    Platelet 130 - 400 x10(3)/mcL 258 217        Orders:  -     CBC Auto Differential; Future; Expected date: 06/09/2025    7. Malignant tumor of prostate  Overview:  History of prostate cancer, sees his urologist, Dr. Lockett.  Still taking abiraterone.      8. History of coronary artery bypass graft  Overview:  Sees Dr. Lowery.  History of CABG several years ago.    Patient sees a specialist for this condition.  This medical condition appears to be stable, patient will continue regular follow-up with the treating specialist.      9. Stenosis of right carotid artery  Overview:  He does have a cardiologist, Dr. Lowery, whom he sees regularly.      10. Lower extremity edema  Overview:  Chronic lower extremity edema, worse on the left side, the vein was removed for his CABG. Takes furosemide 20 mg daily, continue current treatment plan.       11. Hypokalemia  Overview:  Recurrent hypokalemia, on Lasix, prescribed potassium chloride 20 mEq daily.              Advance Care Planning     Date: 12/08/2024    Living Will  During this visit, I engaged the patient  in the voluntary advance care planning process.  The patient and I reviewed the role for advance directives and  their purpose in directing future healthcare if the patient's unable to speak for him/herself.  At this point in time, the patient does have full decision-making capacity.  We discussed different extreme health states that he could experience, and reviewed what kind of medical care he would want in those situations.  The patient communicated that if he were comatose and had little chance of a meaningful recovery, he would want machines/life-sustaining treatments used. In addition to the above preference, other important end-of-life issues for the patient include no other issues.  Patient does have a living will/advanced care planning, will bring a copy to the clinic so that we can place it in the chart.  Patient may also be given the option to complete our advanced care planning paperwork so that we may enter it into the medical record today.  I spent a total of 5 minutes engaging the patient in this advance care planning discussion.              Current Outpatient Medications   Medication Instructions    abiraterone (ZYTIGA) 1,000 mg    amLODIPine-benazepriL (LOTREL) 10-40 mg per capsule 1 capsule, Oral, Daily    atorvastatin (LIPITOR) 40 mg    calcium phosphate trib/vit D3 (CALCIUM PHOSPHATE-VITAMIN D3 ORAL) 1 capsule, 2 times daily    carvediloL (COREG) 25 mg, 2 times daily    finasteride (PROSCAR) 5 mg    FLUoxetine 40 mg, Oral, Daily    furosemide (LASIX) 20 mg, Oral, Daily    olmesartan-amLODIPin-hcthiazid 40-10-25 mg Tab 1 tablet, Daily    potassium chloride SA (K-DUR,KLOR-CON) 20 MEQ tablet 20 mEq, Oral, Daily    predniSONE (DELTASONE) 5 mg    tamsulosin (FLOMAX) 0.4 mg Cap 1 capsule        Follow up in about 6 months (around 6/9/2025) for Chronic condition F/up. In addition to their scheduled follow up, the patient has also been instructed to follow up on as needed basis.     This note was created with the assistance of [employer] voice recognition software or phone dictation. There may be transcription errors  as a result of using this technology. Effort has been made to assure accuracy of transcription but any obvious errors or omissions should be clarified with the author of the document.

## 2024-12-09 ENCOUNTER — OFFICE VISIT (OUTPATIENT)
Dept: PRIMARY CARE CLINIC | Facility: CLINIC | Age: 69
End: 2024-12-09
Payer: MEDICARE

## 2024-12-09 VITALS
HEIGHT: 71 IN | WEIGHT: 188.38 LBS | HEART RATE: 67 BPM | BODY MASS INDEX: 26.37 KG/M2 | SYSTOLIC BLOOD PRESSURE: 128 MMHG | DIASTOLIC BLOOD PRESSURE: 75 MMHG

## 2024-12-09 DIAGNOSIS — D64.9 NORMOCYTIC ANEMIA: Chronic | ICD-10-CM

## 2024-12-09 DIAGNOSIS — Z12.5 SCREENING FOR PROSTATE CANCER: ICD-10-CM

## 2024-12-09 DIAGNOSIS — I10 PRIMARY HYPERTENSION: Chronic | ICD-10-CM

## 2024-12-09 DIAGNOSIS — E78.5 DYSLIPIDEMIA: Chronic | ICD-10-CM

## 2024-12-09 DIAGNOSIS — Z00.00 MEDICARE ANNUAL WELLNESS VISIT, SUBSEQUENT: Primary | ICD-10-CM

## 2024-12-09 DIAGNOSIS — C61 MALIGNANT TUMOR OF PROSTATE: Chronic | ICD-10-CM

## 2024-12-09 DIAGNOSIS — I65.21 STENOSIS OF RIGHT CAROTID ARTERY: Chronic | ICD-10-CM

## 2024-12-09 DIAGNOSIS — E87.6 HYPOKALEMIA: Chronic | ICD-10-CM

## 2024-12-09 DIAGNOSIS — R73.03 PREDIABETES: Chronic | ICD-10-CM

## 2024-12-09 DIAGNOSIS — R60.0 LOWER EXTREMITY EDEMA: Chronic | ICD-10-CM

## 2024-12-09 DIAGNOSIS — Z95.1 HISTORY OF CORONARY ARTERY BYPASS GRAFT: Chronic | ICD-10-CM

## 2024-12-09 PROCEDURE — G0439 PPPS, SUBSEQ VISIT: HCPCS | Mod: ,,, | Performed by: GENERAL PRACTICE

## 2024-12-09 RX ORDER — OLMESARTAN MEDOXOMIL, AMLODIPINE AND HYDROCHLOROTHIAZIDE TABLET 40/10/25 MG 40; 10; 25 MG/1; MG/1; MG/1
1 TABLET ORAL DAILY
COMMUNITY

## 2024-12-09 NOTE — ASSESSMENT & PLAN NOTE
Anemia slightly worse December 2024, we will recheck a CBC at his next visit, June 2025.           Component Ref Range & Units 5 d ago  (12/4/24) 1 yr ago  (11/20/23) 1 yr ago  (7/6/23)   WBC 4.50 - 11.50 x10(3)/mcL 5.54 6.03    RBC 4.70 - 6.10 x10(6)/mcL 3.47 Low  4.33 Low     Hgb 14.0 - 18.0 g/dL 10.2 Low  13.7 Low  11.5 Low    Hct 42.0 - 52.0 % 30.2 Low  39.4 Low     MCV 80.0 - 94.0 fL 87.0 91.0    MCH 27.0 - 31.0 pg 29.4 31.6 High     MCHC 33.0 - 36.0 g/dL 33.8 34.8    RDW 11.5 - 17.0 % 13.9 13.8    Platelet 130 - 400 x10(3)/mcL 258 217

## 2025-01-17 ENCOUNTER — OFFICE VISIT (OUTPATIENT)
Dept: URGENT CARE | Facility: CLINIC | Age: 70
End: 2025-01-17
Payer: MEDICARE

## 2025-01-17 ENCOUNTER — TELEPHONE (OUTPATIENT)
Dept: URGENT CARE | Facility: CLINIC | Age: 70
End: 2025-01-17

## 2025-01-17 VITALS
DIASTOLIC BLOOD PRESSURE: 64 MMHG | RESPIRATION RATE: 20 BRPM | BODY MASS INDEX: 26.32 KG/M2 | OXYGEN SATURATION: 98 % | TEMPERATURE: 99 F | WEIGHT: 188 LBS | HEIGHT: 71 IN | SYSTOLIC BLOOD PRESSURE: 109 MMHG | HEART RATE: 67 BPM

## 2025-01-17 DIAGNOSIS — S82.001A CLOSED NONDISPLACED FRACTURE OF RIGHT PATELLA, UNSPECIFIED FRACTURE MORPHOLOGY, INITIAL ENCOUNTER: Primary | ICD-10-CM

## 2025-01-17 DIAGNOSIS — S89.91XA INJURY OF RIGHT KNEE, INITIAL ENCOUNTER: Primary | ICD-10-CM

## 2025-01-17 PROCEDURE — 99213 OFFICE O/P EST LOW 20 MIN: CPT | Mod: ,,, | Performed by: PHYSICIAN ASSISTANT

## 2025-01-17 RX ORDER — PREDNISONE 10 MG/1
10 TABLET ORAL DAILY
Qty: 5 TABLET | Refills: 0 | Status: SHIPPED | OUTPATIENT
Start: 2025-01-17 | End: 2025-01-22

## 2025-01-17 NOTE — PROGRESS NOTES
"Subjective:      Patient ID: Culry Schwartz is a 69 y.o. male.    Vitals:  height is 5' 11" (1.803 m) and weight is 85.3 kg (188 lb). His tympanic temperature is 99 °F (37.2 °C). His blood pressure is 109/64 and his pulse is 67. His respiration is 20 and oxygen saturation is 98%.     Chief Complaint: Knee Injury (Right/)     Patient is a 69 y.o. male who presents to urgent care with complaints of right knee pain secondary to a fall impacting the knee 3 days ago. Alleviating factors include N/A with no relief. Patient denies fever or open wounds.        Skin:  Negative for erythema.      Objective:     Physical Exam   Constitutional: He is oriented to person, place, and time. He appears well-developed.   HENT:   Head: Normocephalic and atraumatic. Head is without abrasion, without contusion and without laceration.   Ears:   Right Ear: External ear normal.   Left Ear: External ear normal.   Nose: Nose normal.   Mouth/Throat: Oropharynx is clear and moist and mucous membranes are normal.   Eyes: Conjunctivae, EOM and lids are normal.   Neck: Phonation normal. Neck supple.   Pulmonary/Chest: Effort normal. No respiratory distress.   Abdominal: Normal appearance.   Musculoskeletal:         General: Swelling and tenderness present.   Neurological: He is alert and oriented to person, place, and time.   Skin: Skin is warm, dry, intact and no rash. Capillary refill takes less than 2 seconds. bruising No abrasion, No burn, No erythema and No ecchymosis   Psychiatric: His speech is normal and behavior is normal. Judgment and thought content normal.   Nursing note and vitals reviewed.  Right knee:  Positive for swelling throughout the anterior aspect of the knee.  There is a focal area to the medial aspect of the patella ecchymosis present.  Does have tenderness in this area.  He is able to fully extend the leg but does complain of pain with full extension.  Patient is able to flex to 90° without difficulty.  No appreciated " open wounds erythema or warmth.  No appreciated calf swelling erythema or tenderness.    Xrays:  Degenerative changes noted on the knee.  Calcifications noted posterior to the knee consistent with calcium deposits in the artery.  No observed acute fracture.  Awaiting Radiology over-read.    Knee brace given in clinic         Previous History      Review of patient's allergies indicates:   Allergen Reactions    Sulfa (sulfonamide antibiotics) Rash and Other (See Comments)       Past Medical History:   Diagnosis Date    Anxiety     Arteriosclerosis of coronary artery 5/21/2023    Depression     Hyperlipidemia     Hypertension     Incomplete emptying of bladder 01/27/2023    Urinary obstruction due to nodular prostate 1/27/2023     Current Outpatient Medications   Medication Instructions    abiraterone (ZYTIGA) 1,000 mg    amLODIPine-benazepriL (LOTREL) 10-40 mg per capsule 1 capsule, Oral, Daily    atorvastatin (LIPITOR) 40 mg    calcium phosphate trib/vit D3 (CALCIUM PHOSPHATE-VITAMIN D3 ORAL) 1 capsule, 2 times daily    carvediloL (COREG) 25 mg, 2 times daily    finasteride (PROSCAR) 5 mg    FLUoxetine 40 mg, Oral, Daily    furosemide (LASIX) 20 mg, Oral, Daily    olmesartan-amLODIPin-hcthiazid 40-10-25 mg Tab 1 tablet, Daily    potassium chloride SA (K-DUR,KLOR-CON) 20 MEQ tablet 20 mEq, Oral, Daily    predniSONE (DELTASONE) 5 mg    predniSONE (DELTASONE) 10 mg, Oral, Daily    tamsulosin (FLOMAX) 0.4 mg Cap 1 capsule     Past Surgical History:   Procedure Laterality Date    CORONARY ARTERY BYPASS GRAFT  2000    CORONARY STENT PLACEMENT      TRANSURETHRAL RESECTION OF PROSTATE  07/06/2023    Speeg     Family History   Problem Relation Name Age of Onset    Cancer Mother Mechelle Schwartz         Kidney    Depression Mother Mechelle Brennant     Hyperlipidemia Mother Mechelle Brennant     Heart disease Father Sergio Schwartz     Hyperlipidemia Father Sergio Schwartz        Social History     Tobacco Use    Smoking status: Never     "Smokeless tobacco: Never   Substance Use Topics    Alcohol use: Not Currently     Alcohol/week: 2.0 standard drinks of alcohol     Types: 2 Cans of beer per week    Drug use: Never        Physical Exam      Vital Signs Reviewed   /64   Pulse 67   Temp 99 °F (37.2 °C) (Tympanic)   Resp 20   Ht 5' 11" (1.803 m)   Wt 85.3 kg (188 lb)   SpO2 98%   BMI 26.22 kg/m²        Procedures    Procedures     Labs     Results for orders placed or performed in visit on 12/04/24   TSH    Collection Time: 12/04/24  7:45 AM   Result Value Ref Range    TSH 0.899 0.350 - 4.940 uIU/mL   Hemoglobin A1C    Collection Time: 12/04/24  7:45 AM   Result Value Ref Range    Hemoglobin A1c 5.1 <=7.0 %    Estimated Average Glucose 99.7 mg/dL   Lipid Panel    Collection Time: 12/04/24  7:45 AM   Result Value Ref Range    Cholesterol Total 126 <=200 mg/dL    HDL Cholesterol 54 35 - 60 mg/dL    Triglyceride 82 34 - 140 mg/dL    Cholesterol/HDL Ratio 2 0 - 5    Very Low Density Lipoprotein 16     LDL Cholesterol 56.00 50.00 - 140.00 mg/dL   Comprehensive Metabolic Panel    Collection Time: 12/04/24  7:45 AM   Result Value Ref Range    Sodium 134 (L) 136 - 145 mmol/L    Potassium 3.8 3.5 - 5.1 mmol/L    Chloride 100 98 - 107 mmol/L    CO2 27 23 - 31 mmol/L    Glucose 121 (H) 82 - 115 mg/dL    Blood Urea Nitrogen 15.0 8.4 - 25.7 mg/dL    Creatinine 0.99 0.72 - 1.25 mg/dL    Calcium 9.0 8.8 - 10.0 mg/dL    Protein Total 6.1 5.8 - 7.6 gm/dL    Albumin 3.2 (L) 3.4 - 4.8 g/dL    Globulin 2.9 2.4 - 3.5 gm/dL    Albumin/Globulin Ratio 1.1 1.1 - 2.0 ratio    Bilirubin Total 0.4 <=1.5 mg/dL    ALP 78 40 - 150 unit/L    ALT 18 0 - 55 unit/L    AST 21 5 - 34 unit/L    eGFR >60 mL/min/1.73/m2    Anion Gap 7.0 mEq/L    BUN/Creatinine Ratio 15    CBC with Differential    Collection Time: 12/04/24  7:45 AM   Result Value Ref Range    WBC 5.54 4.50 - 11.50 x10(3)/mcL    RBC 3.47 (L) 4.70 - 6.10 x10(6)/mcL    Hgb 10.2 (L) 14.0 - 18.0 g/dL    Hct 30.2 (L) " 42.0 - 52.0 %    MCV 87.0 80.0 - 94.0 fL    MCH 29.4 27.0 - 31.0 pg    MCHC 33.8 33.0 - 36.0 g/dL    RDW 13.9 11.5 - 17.0 %    Platelet 258 130 - 400 x10(3)/mcL    MPV 8.0 7.4 - 10.4 fL    Neut % 72.5 %    Lymph % 11.0 %    Mono % 12.8 %    Eos % 2.5 %    Basophil % 0.7 %    Lymph # 0.61 0.6 - 4.6 x10(3)/mcL    Neut # 4.01 2.1 - 9.2 x10(3)/mcL    Mono # 0.71 0.1 - 1.3 x10(3)/mcL    Eos # 0.14 0 - 0.9 x10(3)/mcL    Baso # 0.04 <=0.2 x10(3)/mcL    Imm Gran # 0.03 0 - 0.04 x10(3)/mcL    Imm Grans % 0.5 %    NRBC% 0.0 %     Assessment:     1. Injury of right knee, initial encounter        Plan:       Injury of right knee, initial encounter  -     XR KNEE 3 VIEW RIGHT; Future; Expected date: 01/17/2025  -     [facility] - OTHER  -     Ambulatory referral/consult to Orthopedics    Other orders  -     predniSONE (DELTASONE) 10 MG tablet; Take 1 tablet (10 mg total) by mouth once daily. for 5 days  Dispense: 5 tablet; Refill: 0      Elevate extremity above the level of the heart while resting to avoid excessive swelling.     The patient will use his walker from home    Get plenty of rest.    Follow-up with Orthopedics as directed.    Go to the Emergency Department with any significant change or worsening symptoms.

## 2025-01-17 NOTE — PATIENT INSTRUCTIONS
Elevate extremity above the level of the heart while resting to avoid excessive swelling.     Get plenty of rest.    Follow-up with Orthopedics as directed.    Go to the Emergency Department with any significant change or worsening symptoms.

## 2025-01-18 NOTE — TELEPHONE ENCOUNTER
X-ray results reviewed.  I contacted the patient's daughter who is a physical therapist to discuss the findings.  She voiced understanding.  I will place an order for a straight leg immobilizer.  He will use this while also using his walker.

## 2025-02-12 ENCOUNTER — CLINICAL SUPPORT (OUTPATIENT)
Dept: URGENT CARE | Facility: CLINIC | Age: 70
End: 2025-02-12
Payer: MEDICARE

## 2025-02-12 DIAGNOSIS — Z11.1 PPD SCREENING TEST: Primary | ICD-10-CM

## 2025-02-12 PROCEDURE — 86580 TB INTRADERMAL TEST: CPT | Mod: ,,, | Performed by: FAMILY MEDICINE

## 2025-02-12 NOTE — PROGRESS NOTES
PPD Placement note  Curly Schwartz, 69 y.o. male is here today for placement of PPD test  Reason for PPD test: routine  Pt taken PPD test before: yes  Verified in allergy area and with patient that they are not allergic to the products PPD is made of (Phenol or Tween). No:   Is patient taking any oral or IV steroid medication now or have they taken it in the last month? no  Has the patient ever received the BCG vaccine?: no  Has the patient been in recent contact with anyone known or suspected of having active TB disease?: no       Date of exposure (if applicable): N/A       Name of person they were exposed to (if applicable): N/A  Patient's Country of origin?: N/A  O: Alert and oriented in NAD.  P:  PPD placed on 2/12/2025.  Patient advised to return for reading within 48-72 hours.

## 2025-02-14 ENCOUNTER — CLINICAL SUPPORT (OUTPATIENT)
Dept: URGENT CARE | Facility: CLINIC | Age: 70
End: 2025-02-14
Payer: MEDICARE

## 2025-02-14 DIAGNOSIS — Z11.1 VISIT FOR TB SKIN TEST: Primary | ICD-10-CM

## 2025-02-14 LAB
TB INDURATION - 48 HR READ: 0 MM
TB INDURATION - 72 HR READ: NORMAL
TB SKIN TEST - 48 HR READ: NEGATIVE
TB SKIN TEST - 72 HR READ: NORMAL

## 2025-02-14 NOTE — PROGRESS NOTES
PPD Reading Note  PPD read and results entered in [employer].  Result: 0 mm induration.  Interpretation: negative  If test not read within 48-72 hours of initial placement, patient advised to repeat in other arm 1-3 weeks after this test.  Allergic reaction: no

## 2025-02-14 NOTE — PROGRESS NOTES
Subjective:      Patient ID: Curly Schwartz is a 69 y.o. male.    Vitals:  vitals were not taken for this visit.     Chief Complaint: No chief complaint on file.    HPI  ROS   Objective:     Physical Exam    Assessment:     No diagnosis found.    Plan:       There are no diagnoses linked to this encounter.

## 2025-02-21 ENCOUNTER — TELEPHONE (OUTPATIENT)
Dept: PRIMARY CARE CLINIC | Facility: CLINIC | Age: 70
End: 2025-02-21
Payer: MEDICARE

## 2025-02-21 NOTE — TELEPHONE ENCOUNTER
Called and spoke to pt daughter. Pt will be moving into a assisted living in [facility] an [facility] needing some paperwork to be filled out about current medication, medical history and vitals. Pt is scheduled for an appt

## 2025-02-21 NOTE — TELEPHONE ENCOUNTER
----- Message from Nesha sent at 2/21/2025  8:52 AM CST -----  Regarding: homehealth - intake  .Type:  Needs Medical AdviceWho Called: pt - daughter joanSymptoms (please be specific):  How long has patient had these symptoms:  Pharmacy name and phone #:  Would the patient rather a call back or a response via [facility]? [facility] Call Back Number: [phone] Information:

## 2025-02-22 PROBLEM — Z02.2 ENCOUNTER FOR EXAMINATION FOR ADMISSION TO ASSISTED LIVING FACILITY: Status: ACTIVE | Noted: 2025-02-22

## 2025-02-22 NOTE — PROGRESS NOTES
"Subjective:       Patient ID: Curly Schwartz is a 69 y.o. male.    Chief Complaint[facility]    Established patient, presents to the clinic for the reason of having paperwork completed to move to an assisted living facility.  He brought in a form for our completion, he is fairly independent, is able to ambulate with a walker.      Review of Systems   Constitutional: Negative.  Negative for fatigue and fever.   HENT: Negative.  Negative for sore throat and trouble swallowing.    Eyes: Negative.  Negative for redness and visual disturbance.   Respiratory: Negative.  Negative for chest tightness and shortness of breath.    Cardiovascular: Negative.  Negative for chest pain.   Gastrointestinal: Negative.  Negative for abdominal pain, blood in stool and nausea.   Endocrine: Negative.  Negative for cold intolerance, heat intolerance and polyuria.   Genitourinary: Negative.    Musculoskeletal: Negative.  Negative for arthralgias, gait problem and joint swelling.   Integumentary:  Negative for rash. Negative.   Neurological: Negative.  Negative for dizziness, weakness and headaches.   Psychiatric/Behavioral: Negative.  Negative for agitation and dysphoric mood.            Patient Care Team:  Jimbo Mora MD as PCP - General (Family Medicine)  MATTHIAS Chakraborty MD (Gastroenterology)  Lawson Lowery II, MD as Consulting Physician (Cardiology)  Drew Lockett MD as Consulting Physician (Urology)  Lewis Parmar MD as Consulting Physician (Urology)  Objective:   Visit Vitals  /79 (BP Location: Left arm, Patient Position: Sitting)   Pulse 60   Resp 18   Ht 5' 11" (1.803 m)   Wt 79.4 kg (175 lb)   SpO2 99%   BMI 24.41 kg/m²        Physical Exam  Constitutional:       Appearance: Normal appearance.   HENT:      Head: Normocephalic.      Mouth/Throat:      Mouth: Mucous membranes are moist.      Pharynx: Oropharynx is clear.   Eyes:      Conjunctiva/sclera: Conjunctivae normal. " "     Pupils: Pupils are equal, round, and reactive to light.   Cardiovascular:      Rate and Rhythm: Normal rate and regular rhythm.      Heart sounds: Normal heart sounds.   Pulmonary:      Effort: Pulmonary effort is normal.      Breath sounds: Normal breath sounds.   Abdominal:      General: Abdomen is flat.      Palpations: Abdomen is soft.   Musculoskeletal:         General: Normal range of motion.      Cervical back: Neck supple.   Skin:     General: Skin is warm and dry.   Neurological:      General: No focal deficit present.      Mental Status: He is alert and oriented to person, place, and time.   Psychiatric:         Mood and Affect: Mood normal.         Behavior: Behavior normal.         Thought Content: Thought content normal.         Judgment: Judgment normal.           Lab Results   Component Value Date     (L) 12/04/2024    K 3.8 12/04/2024     12/04/2024    CO2 27 12/04/2024    BUN 15.0 12/04/2024    CREATININE 0.99 12/04/2024    CALCIUM 9.0 12/04/2024    ALBUMIN 3.2 (L) 12/04/2024    BILITOT 0.4 12/04/2024    ALKPHOS 78 12/04/2024    AST 21 12/04/2024    ALT 18 12/04/2024    ANIONGAP 10 11/01/2024    ESTGFRAFRICA 87 11/01/2024    EGFRNORACEVR >60 12/04/2024     Lab Results   Component Value Date    WBC 5.54 12/04/2024    RBC 3.47 (L) 12/04/2024    HGB 10.2 (L) 12/04/2024    HCT 30.2 (L) 12/04/2024    MCV 87.0 12/04/2024    RDW 13.9 12/04/2024     12/04/2024      Lab Results   Component Value Date    CHOL 126 12/04/2024    TRIG 82 12/04/2024    HDL 54 12/04/2024    LDL 56.00 12/04/2024    TOTALCHOLEST 2 12/04/2024     Lab Results   Component Value Date    TSH 0.899 12/04/2024     Lab Results   Component Value Date    HGBA1C 5.1 12/04/2024        No results found for: "PSA"      Assessment:       ICD-10-CM ICD-9-CM   1. Encounter for examination for admission to assisted living facility  Z02.2 V70.8   2. Primary hypertension  I10 401.9   3. Prediabetes  R73.03 790.29   4. Normocytic " anemia  D64.9 285.9   5. Aortic atherosclerosis  I70.0 440.0   6. Malignant tumor of prostate  C61 185       Current Outpatient Medications   Medication Instructions    abiraterone (ZYTIGA) 1,000 mg    acetaminophen (TYLENOL ORAL) As needed (PRN)    amLODIPine-benazepriL (LOTREL) 10-40 mg per capsule 1 capsule, Oral, Daily    atorvastatin (LIPITOR) 40 mg    calcium phosphate trib/vit D3 (CALCIUM PHOSPHATE-VITAMIN D3 ORAL) 1 capsule, 2 times daily    carvediloL (COREG) 25 mg, 2 times daily    FLUoxetine 40 mg, Oral, Daily    furosemide (LASIX) 20 mg, Oral, Daily    olmesartan-amLODIPin-hcthiazid 40-10-25 mg Tab 1 tablet, Daily    potassium chloride SA (K-DUR,KLOR-CON) 20 MEQ tablet 20 mEq, Oral, Daily    predniSONE (DELTASONE) 5 mg     Plan:     Problem List Items Addressed This Visit          Cardiac/Vascular    Primary hypertension (Chronic)    Overview   Prescribed amlodipine benazepril 10-40 mg daily, Coreg 25 mg b.i.d..    Blood pressures appear to be adequately controlled with current treatment plan, including prescription blood pressure medication.  Medication(s) appear to be effective at current dosages, and are not causing any side effects or problems.  Continue medical management and continue home blood pressure checks.  Average blood pressures at home should be no greater than 130/80.  Patient instructed to contact the clinic if blood pressures become elevated at any point prior to next clinic visit.    Medication will be continued at the current dosage.         Relevant Orders    Comprehensive Metabolic Panel    CBC Auto Differential    TSH    Aortic atherosclerosis    Overview   From CT of chest October 2024:    FINDINGS: Small bilateral pleural effusions with subjacent atelectasis. Right upper lobe calcified granuloma. The airways are patent. No pneumothorax.   There is calcific atherosclerosis of the aorta and coronary arteries; the heart and great vessels are otherwise normal.     All risk factors for  progression of aortic atherosclerosis have been or will be modified to achieve maximal medical management.            Oncology    Malignant tumor of prostate (Chronic)    Overview   History of prostate cancer diagnosed around 2023, sees his urologist, Dr. Lockett.  Underwent resection of prostate.  Taking abiraterone, estimated time to be on that medication is about two years.           Normocytic anemia (Chronic)    Overview   Patient has a diagnosis of anemia, etiology uncertain.  Anemia has been mild and asymptomatic.  We will continue to monitor regularly.  If anemia changes in any significant way, or if any significant symptoms develop which could be attributed to the anemia, more aggressive evaluation, treatment, and possibly referral will be considered.         Current Assessment & Plan   CBC obtained on 02/24/2025.         Relevant Orders    CBC Auto Differential       Endocrine    Prediabetes (Chronic)    Overview   Patient has prediabetes and is not prescribed medication.  Patient's prediabetes is treated and controlled adequately using conservative means, specifically lifestyle modification, dietary changes, and/or increase in activity/exercise.    Patient instructed to:  -Follow a healthy meal plan, including lean proteins, complex carbohydrates in moderation, fresh fruits and vegetables, low-fat dairy products, and healthy fats such as avocado, olive, canola, or vegetable oil.  -Simple carbohydrates such as sweets should be avoided or consumed uncontrolled amounts  -Physical activity recommended, 30 minutes from more up to five days a week.  This could be brisk walking or biking.  -Try to lose weight, this would be beneficial in reversing the condition of prediabetes, and/or preventing progression to full-blown diabetes.         Relevant Orders    Hemoglobin A1C       Other    Encounter for examination for admission to assisted living facility - Primary    Current Assessment & Plan   Paperwork for  completion will be completed on 02/24/2025.  I see no issues with this admission.                    Follow up in 10 months (on 12/11/2025) for Medicare Wellness.    This note was created with the assistance of [employer] voice recognition software or phone dictation. There may be transcription errors as a result of using this technology. Effort has been made to assure accuracy of transcription but any obvious errors or omissions should be clarified with the author of the document.

## 2025-02-24 ENCOUNTER — OFFICE VISIT (OUTPATIENT)
Dept: PRIMARY CARE CLINIC | Facility: CLINIC | Age: 70
End: 2025-02-24
Payer: MEDICARE

## 2025-02-24 VITALS
HEART RATE: 60 BPM | RESPIRATION RATE: 18 BRPM | HEIGHT: 71 IN | WEIGHT: 175 LBS | OXYGEN SATURATION: 99 % | BODY MASS INDEX: 24.5 KG/M2 | SYSTOLIC BLOOD PRESSURE: 125 MMHG | DIASTOLIC BLOOD PRESSURE: 79 MMHG

## 2025-02-24 DIAGNOSIS — Z02.2 ENCOUNTER FOR EXAMINATION FOR ADMISSION TO ASSISTED LIVING FACILITY: Primary | ICD-10-CM

## 2025-02-24 DIAGNOSIS — I10 PRIMARY HYPERTENSION: Chronic | ICD-10-CM

## 2025-02-24 DIAGNOSIS — I70.0 AORTIC ATHEROSCLEROSIS: ICD-10-CM

## 2025-02-24 DIAGNOSIS — C61 MALIGNANT TUMOR OF PROSTATE: Chronic | ICD-10-CM

## 2025-02-24 DIAGNOSIS — D64.9 NORMOCYTIC ANEMIA: ICD-10-CM

## 2025-02-24 DIAGNOSIS — R73.03 PREDIABETES: Chronic | ICD-10-CM

## 2025-02-24 LAB
BASOPHILS # BLD AUTO: 0.06 X10(3)/MCL
BASOPHILS NFR BLD AUTO: 0.6 %
EOSINOPHIL # BLD AUTO: 0.12 X10(3)/MCL (ref 0–0.9)
EOSINOPHIL NFR BLD AUTO: 1.3 %
ERYTHROCYTE [DISTWIDTH] IN BLOOD BY AUTOMATED COUNT: 15.3 % (ref 11.5–17)
HCT VFR BLD AUTO: 38.1 % (ref 42–52)
HGB BLD-MCNC: 12.5 G/DL (ref 14–18)
IMM GRANULOCYTES # BLD AUTO: 0.06 X10(3)/MCL (ref 0–0.04)
IMM GRANULOCYTES NFR BLD AUTO: 0.6 %
LYMPHOCYTES # BLD AUTO: 0.83 X10(3)/MCL (ref 0.6–4.6)
LYMPHOCYTES NFR BLD AUTO: 8.8 %
MCH RBC QN AUTO: 27.3 PG (ref 27–31)
MCHC RBC AUTO-ENTMCNC: 32.8 G/DL (ref 33–36)
MCV RBC AUTO: 83.2 FL (ref 80–94)
MONOCYTES # BLD AUTO: 1.04 X10(3)/MCL (ref 0.1–1.3)
MONOCYTES NFR BLD AUTO: 11 %
NEUTROPHILS # BLD AUTO: 7.35 X10(3)/MCL (ref 2.1–9.2)
NEUTROPHILS NFR BLD AUTO: 77.7 %
NRBC BLD AUTO-RTO: 0 %
PLATELET # BLD AUTO: 274 X10(3)/MCL (ref 130–400)
PMV BLD AUTO: 8.4 FL (ref 7.4–10.4)
RBC # BLD AUTO: 4.58 X10(6)/MCL (ref 4.7–6.1)
WBC # BLD AUTO: 9.46 X10(3)/MCL (ref 4.5–11.5)

## 2025-02-24 PROCEDURE — 36415 COLL VENOUS BLD VENIPUNCTURE: CPT | Performed by: GENERAL PRACTICE

## 2025-02-24 PROCEDURE — 85025 COMPLETE CBC W/AUTO DIFF WBC: CPT | Performed by: GENERAL PRACTICE

## 2025-02-24 PROCEDURE — 99214 OFFICE O/P EST MOD 30 MIN: CPT | Mod: ,,, | Performed by: GENERAL PRACTICE

## 2025-02-24 PROCEDURE — 36415 COLL VENOUS BLD VENIPUNCTURE: CPT | Mod: ,,, | Performed by: GENERAL PRACTICE

## 2025-02-24 RX ORDER — PREDNISONE 5 MG/1
5 TABLET ORAL
COMMUNITY

## 2025-02-24 NOTE — PROGRESS NOTES
Patient verified name and .Patient here for nurse visit to draw CBC labs with butterfly needle. Patient was drawn in left antecubital .No complications or issues occurred. Patient tolerated venipuncture well. Patient expressed no questions or concerns.

## 2025-02-27 ENCOUNTER — RESULTS FOLLOW-UP (OUTPATIENT)
Dept: PRIMARY CARE CLINIC | Facility: CLINIC | Age: 70
End: 2025-02-27
Payer: MEDICARE

## 2025-02-27 NOTE — TELEPHONE ENCOUNTER
Patient verified name, pt given lab results over the phone. Pt vocalized understanding and had no questions or concerns.

## 2025-02-27 NOTE — TELEPHONE ENCOUNTER
----- Message from Jimbo Mora MD sent at 2/27/2025 12:45 PM CST -----  Please let patient or his family know that his blood count was improved, his anemia was not completely gone but was much improved compared to the last time we checked in December, no changes in   treatment plan, make sure he keeps his next appointment.  ----- Message -----  From: Lab, Background User  Sent: 2/24/2025   1:59 PM CST  To: Jimbo Mora MD

## 2025-08-18 DIAGNOSIS — F41.8 ANXIETY WITH DEPRESSION: Primary | Chronic | ICD-10-CM

## 2025-08-18 RX ORDER — FLUOXETINE HYDROCHLORIDE 40 MG/1
40 CAPSULE ORAL DAILY
Qty: 90 CAPSULE | Refills: 3 | Status: SHIPPED | OUTPATIENT
Start: 2025-08-18 | End: 2026-08-18